# Patient Record
Sex: MALE | Race: WHITE | NOT HISPANIC OR LATINO | Employment: FULL TIME | URBAN - METROPOLITAN AREA
[De-identification: names, ages, dates, MRNs, and addresses within clinical notes are randomized per-mention and may not be internally consistent; named-entity substitution may affect disease eponyms.]

---

## 2020-11-30 ENCOUNTER — HOSPITAL ENCOUNTER (OUTPATIENT)
Dept: URGENT CARE | Facility: CLINIC | Age: 65
Discharge: HOME | End: 2020-11-30

## 2020-12-02 LAB — SARS-COV-2 RNA SPEC QL NAA+PROBE: NOT DETECTED

## 2020-12-16 ENCOUNTER — OFFICE VISIT CONVERTED (OUTPATIENT)
Dept: SURGERY | Facility: CLINIC | Age: 65
End: 2020-12-16
Attending: NURSE PRACTITIONER

## 2021-01-13 ENCOUNTER — HOSPITAL ENCOUNTER (OUTPATIENT)
Dept: GASTROENTEROLOGY | Facility: HOSPITAL | Age: 66
Setting detail: HOSPITAL OUTPATIENT SURGERY
Discharge: HOME | End: 2021-01-13
Attending: SURGERY

## 2021-05-10 NOTE — H&P
History and Physical      Patient Name: Rehan Ramirez   Patient ID: [id number]   Sex: Male   YOB: 1955    Primary Care Provider: Guillaume Hankins MD   Referring Provider: Guillaume Hankins MD    Visit Date: December 16, 2020    Provider: LINDA Palencia   Location: [facility]   Location Address: [address] [place] [address]  [id number]   Location Phone: [phone]          Chief Complaint  · Requesting colonoscopy  · Age 50 or over  · Here today for a pre-surgical colon screening visit  · Personal History of Polyps      History Of Present Illness  The patient is a 65 year old /White male presenting to the Surgical Specialist office on a referral from Guillaume Hankins MD.   Rehan Ramirez needs to have a screening colonoscopy.   Patient states that they have had a colonoscopy. 10 years ago   Patient currently complains of: no complaints   Patient Does not have family history of colon cancer.      Patient presents today on referral from Dr. Guillaume Hankins for screening colonoscopy.  Patient denies abdominal pain, diarrhea, or rectal bleeding.  Denies family history of colorectal cancer.  Patient reports last colonoscopy was 10 years ago and had some polyps removed.    Patient reports that he had a recent recurrent diverticulitis and was treated with Cipro and Keflex about 2 months ago.       Past Medical History  Disease Name Date Onset Notes   Arthritis --  --    High blood pressure --  --    High cholesterol --  --          Past Surgical History  Procedure Name Date Notes   Appendectomy --  --    Back --  --    Hernia --  --          Medication List  Name Date Started Instructions   diclofenac sodium 75 mg oral tablet,delayed release (/EC)  take 1 tablet (75 mg) by oral route 2 times per day   lisinopril 5 mg oral tablet  take 1 tablet (5 mg) by oral route once daily   metoprolol tartrate 50 mg oral tablet  take 1 tablet (50 mg)  "by oral route 2 times per day with meals   omeprazole 20 mg oral capsule,delayed release(DR/EC)  take 1 capsule (20 mg) by oral route once daily before a meal   pravastatin 10 mg oral tablet  take 1 tablet (10 mg) by oral route once daily         Allergy List  Allergen Name Date Reaction Notes   Bactrim DS --  rash --    SULFA (SULFONAMIDES) --  --  --        Allergies Reconciled  Family Medical History  Disease Name Relative/Age Notes   Diabetes, unspecified type Brother/   Brother   Renal Calculus Sister/   Sister   -Father's Family History Unknown Father/   Father   -Mother's Family History Unknown Mother/   Mother         Social History  Finding Status Start/Stop Quantity Notes   Alcohol Never 0/0 --  drinks no   Caffeine Current every day 0/0 --  drinks regularly; coffee and soft drinks; 3-4 times per day   Second hand smoke exposure Never 0/0 --  no   Tobacco Never --/-- --  --          Review of Systems  · Constitutional  o Denies  o : fever, chills  · Eyes  o Denies  o : yellowish discoloration of eyes  · HENT  o Denies  o : difficulty swallowing  · Cardiovascular  o Denies  o : chest pain, chest pain on exertion  · Respiratory  o Denies  o : shortness of breath  · Gastrointestinal  o Denies  o : nausea, vomiting, diarrhea, constipation  · Genitourinary  o Denies  o : abnormal color of urine  · Integument  o Denies  o : rash  · Neurologic  o Denies  o : tingling or numbness  · Musculoskeletal  o Denies  o : joint pain  · Endocrine  o Denies  o : weight gain, weight loss      Vitals  Date Time BP Position Site L\R Cuff Size HR RR TEMP (F) WT  HT  BMI kg/m2 BSA m2 O2 Sat FR L/min FiO2        12/16/2020 09:21 AM       14  225lbs 2oz 6'  1\" 29.7 2.29             Physical Examination  · Constitutional  o Appearance  o : well developed, well-nourished, patient in no apparent distress  · Head and Face  o Head  o :   § Inspection  § : atraumatic, normocephalic  o Face  o :   § Inspection  § : no facial " lesions  · Eyes  o Conjunctivae  o : conjunctivae normal  o Sclerae  o : sclerae white  · Neck  o Inspection/Palpation  o : normal appearance, no masses or tenderness, trachea midline  · Respiratory  o Respiratory Effort  o : breathing unlabored  · Skin and Subcutaneous Tissue  o General Inspection  o : no lesions present, no areas of discoloration, skin turgor normal, texture normal  · Neurologic  o Mental Status Examination  o :   § Orientation  § : grossly oriented to person, place and time  § Attention  § : attention normal, concentration abilities normal  § Fund of Knowledge  § : fund of knowledge within normal limits, patient aware of current events  o Gait and Station  o : normal gait, able to stand without difficulty  · Psychiatric  o Judgement and Insight  o : judgment and insight intact  o Mood and Affect  o : mood normal, affect appropriate              Assessment  · Personal history of colonic polyps     V12.72/Z86.010  · Screening for colon cancer     V76.51/Z12.11    Problems Reconciled  Plan  · Orders  o Consent for Colonoscopy Screening-Possible risk/complications, benefits, and alternatives to surgical or invasive procedure have been explained to patient and/or legal guardian. -Patient has been evaluated and can tolerate anesthesia and/or sedation. Risks, benefits, and alternatives to anesthesia and sedation have been explained to patient and/or legal guardian. () - V12.72/Z86.010, V76.51/Z12.11 - 01/13/2021  · Medications  o Medications have been Reconciled  o Transition of Care or Provider Policy  · Instructions  o Surgical Facility: [facility]  o Handouts Provided Pre-Procedure Instructions including date, time, and location of procedure.   o PLAN: Proceeed with colonoscopy. Patient understands risks/benefits and is willing to proceed.   o ***Surgical Orders***  o RISK AND BENEFITS:  o Given these options, the patient has verbally expressed an understanding of the risks of the  surgery and finds these risks acceptable. Will proceed with surgery as soon as possible.  o O.R. PREP: Per protocol   o IV: Per Anesthesia  o Please sign permit for: Colonoscopy with possible biopsies by Dr. Azevedo.  o The above History and Physical Examination has been completed within 30 days of admission.  o ***Patient Status***  o Outpatient  o Follow up in the in the office post procedure.  o Electronically Identified Patient Education Materials Provided Electronically            Electronically Signed by: LINDA Palencia -Author on December 16, 2020 09:51:13 AM

## 2021-05-14 VITALS — HEIGHT: 73 IN | WEIGHT: 225.12 LBS | BODY MASS INDEX: 29.83 KG/M2 | RESPIRATION RATE: 14 BRPM

## 2022-02-07 ENCOUNTER — OFFICE VISIT (OUTPATIENT)
Dept: UROLOGY | Facility: CLINIC | Age: 67
End: 2022-02-07

## 2022-02-07 VITALS — HEIGHT: 73 IN | RESPIRATION RATE: 12 BRPM | BODY MASS INDEX: 30.38 KG/M2 | WEIGHT: 229.2 LBS

## 2022-02-07 DIAGNOSIS — R35.0 FREQUENCY OF URINATION: Primary | ICD-10-CM

## 2022-02-07 DIAGNOSIS — R31.0 GROSS HEMATURIA: ICD-10-CM

## 2022-02-07 PROBLEM — I10 HIGH BLOOD PRESSURE: Status: ACTIVE | Noted: 2022-02-07

## 2022-02-07 PROBLEM — E78.00 HIGH CHOLESTEROL: Status: ACTIVE | Noted: 2022-02-07

## 2022-02-07 PROBLEM — M19.90 ARTHRITIS: Status: ACTIVE | Noted: 2022-02-07

## 2022-02-07 LAB
BILIRUB BLD-MCNC: NEGATIVE MG/DL
CLARITY, POC: CLEAR
COLOR UR: YELLOW
EXPIRATION DATE: NORMAL
GLUCOSE UR STRIP-MCNC: NEGATIVE MG/DL
KETONES UR QL: NEGATIVE
LEUKOCYTE EST, POC: NEGATIVE
Lab: NORMAL
NITRITE UR-MCNC: NEGATIVE MG/ML
PH UR: 6 [PH] (ref 5–8)
PROT UR STRIP-MCNC: NEGATIVE MG/DL
RBC # UR STRIP: NEGATIVE /UL
SP GR UR: 1.02 (ref 1–1.03)
URINE VOLUME: 0
UROBILINOGEN UR QL: NORMAL

## 2022-02-07 PROCEDURE — 51798 US URINE CAPACITY MEASURE: CPT | Performed by: NURSE PRACTITIONER

## 2022-02-07 PROCEDURE — 99213 OFFICE O/P EST LOW 20 MIN: CPT | Performed by: NURSE PRACTITIONER

## 2022-02-07 PROCEDURE — 81003 URINALYSIS AUTO W/O SCOPE: CPT | Performed by: NURSE PRACTITIONER

## 2022-02-07 RX ORDER — LISINOPRIL 5 MG/1
TABLET ORAL
COMMUNITY

## 2022-02-07 RX ORDER — LISINOPRIL AND HYDROCHLOROTHIAZIDE 12.5; 1 MG/1; MG/1
1 TABLET ORAL DAILY
COMMUNITY
Start: 2022-01-27

## 2022-02-07 RX ORDER — METOPROLOL TARTRATE 50 MG/1
50 TABLET, FILM COATED ORAL DAILY
COMMUNITY
Start: 2022-01-27

## 2022-02-07 RX ORDER — DICLOFENAC SODIUM 75 MG/1
75 TABLET, DELAYED RELEASE ORAL 2 TIMES DAILY PRN
COMMUNITY
Start: 2022-01-27

## 2022-02-07 RX ORDER — OMEPRAZOLE 40 MG/1
40 CAPSULE, DELAYED RELEASE ORAL DAILY
COMMUNITY
Start: 2022-01-27

## 2022-02-07 RX ORDER — PRAVASTATIN SODIUM 10 MG
10 TABLET ORAL
COMMUNITY
Start: 2022-01-27

## 2022-02-07 NOTE — PROGRESS NOTES
Chief Complaint: Urinary Urgency (Pt here for ua urgency.  Pt says he hardly ever gets up at night.  Pt has seen blood in his urine.  Was checked for UTI and that was negative.)    Subjective         History of Present Illness  Rehan Ramirez is a 66 y.o. male presents to [facility] to be seen for urinary frequency.    Patient states that about a month ago he was with urinary urgency and frequency.    He states at that time he also had some low back pain and flank pain.     Patient has had some gross hematuria about 2 days after the urgency and frequency started this lasted for a few days.    Patient states that the pain is gone now.     He states his left testicle swells intermittently from time to time.     patient non smoker.    No hx of renal stone.     Sister with recurrent stones and a staghorn calculus that caused non functioning kidney.     No family HX of  malignancies.       Objective     Past Medical History:   Diagnosis Date   • Acid reflux    • Arthritis    • High cholesterol    • Hypertension        Past Surgical History:   Procedure Laterality Date   • APPENDECTOMY     • BACK SURGERY     • COLONOSCOPY     • HERNIA REPAIR     • TONSILLECTOMY           Current Outpatient Medications:   •  diclofenac (VOLTAREN) 75 MG EC tablet, Take 75 mg by mouth 2 (Two) Times a Day As Needed., Disp: , Rfl:   •  lisinopril (PRINIVIL,ZESTRIL) 5 MG tablet, lisinopril 5 mg oral tablet take 1 tablet (5 mg) by oral route once daily   Active, Disp: , Rfl:   •  lisinopril-hydrochlorothiazide (PRINZIDE,ZESTORETIC) 10-12.5 MG per tablet, Take 1 tablet by mouth Daily., Disp: , Rfl:   •  metoprolol tartrate (LOPRESSOR) 50 MG tablet, Take 50 mg by mouth Daily., Disp: , Rfl:   •  omeprazole (priLOSEC) 40 MG capsule, Take 40 mg by mouth Daily., Disp: , Rfl:   •  pravastatin (PRAVACHOL) 10 MG tablet, Take 10 mg by mouth every night at bedtime., Disp: , Rfl:     Allergies   Allergen Reactions   • Sulfa  "Antibiotics Hives   • Sulfamethoxazole-Trimethoprim Rash        Family History   Problem Relation Age of Onset   • Heart disease Father    • Cancer Mother    • Diabetes Maternal Grandmother        Social History     Socioeconomic History   • Marital status:    Tobacco Use   • Smoking status: Never Smoker   • Smokeless tobacco: Never Used   Vaping Use   • Vaping Use: Never used   Substance and Sexual Activity   • Alcohol use: Defer   • Sexual activity: Defer       Vital Signs:   Resp 12   Ht 185.4 cm (73\")   Wt 104 kg (229 lb 3.2 oz)   BMI 30.24 kg/m²      Physical Exam     Result Review :   The following data was reviewed by: LINDA Murray on 02/07/2022:  Results for orders placed or performed in visit on 02/07/22   Bladder Scan   Result Value Ref Range    Urine Volume 0    POC Urinalysis Dipstick, Automated    Specimen: Urine   Result Value Ref Range    Color Yellow Yellow, Straw, Dark Yellow, Rochelle    Clarity, UA Clear Clear    Specific Gravity  1.025 1.005 - 1.030    pH, Urine 6.0 5.0 - 8.0    Leukocytes Negative Negative    Nitrite, UA Negative Negative    Protein, POC Negative Negative mg/dL    Glucose, UA Negative Negative, 1000 mg/dL (3+) mg/dL    Ketones, UA Negative Negative    Urobilinogen, UA Normal Normal    Bilirubin Negative Negative    Blood, UA Negative Negative    Lot Number [id number]     Expiration Date 2,022/12           Procedures        Assessment and Plan    Diagnoses and all orders for this visit:    1. Frequency of urination (Primary)  -     POC Urinalysis Dipstick, Automated  -     Bladder Scan    2. Gross hematuria  -     Cancel: CT Abdomen Pelvis With & Without Contrast; Future  -     CT Abdomen Pelvis With & Without Contrast; Future    Discussed with patient his symptoms sound consistent with a passed renal stone however given negative history for renal stones I would like to perform a work-up for gross hematuria.  Patient will be scheduled for a CT scan of the abdomen " and pelvis with and without IV contrast with delayed imaging as well as a cystoscopy to evaluate his upper and lower urinary tracts for any underlying pathology.          I spent 15 minutes caring for Rehan on this date of service. This time includes time spent by me in the following activities:reviewing tests, obtaining and/or reviewing a separately obtained history, performing a medically appropriate examination and/or evaluation , counseling and educating the patient/family/caregiver, ordering medications, tests, or procedures, and documenting information in the medical record  Follow Up   Return for 4-week follow-up with me schedule with Dr. Del Rio For Cystoscopy.  Patient was given instructions and counseling regarding his condition or for health maintenance advice. Please see specific information pulled into the AVS if appropriate.         This document has been electronically signed by LINDA Murray  February 7, 2022 14:41 EST

## 2022-02-23 ENCOUNTER — TELEPHONE (OUTPATIENT)
Dept: UROLOGY | Facility: CLINIC | Age: 67
End: 2022-02-23

## 2022-02-23 ENCOUNTER — HOSPITAL ENCOUNTER (OUTPATIENT)
Dept: CT IMAGING | Facility: HOSPITAL | Age: 67
Discharge: HOME | End: 2022-02-23
Admitting: NURSE PRACTITIONER

## 2022-02-23 DIAGNOSIS — K40.90 UNILATERAL INGUINAL HERNIA WITHOUT OBSTRUCTION OR GANGRENE, RECURRENCE NOT SPECIFIED: Primary | ICD-10-CM

## 2022-02-23 DIAGNOSIS — R31.0 GROSS HEMATURIA: ICD-10-CM

## 2022-02-23 LAB — CREAT BLDA-MCNC: 1.3 MG/DL

## 2022-02-23 PROCEDURE — 0 IOPAMIDOL PER 1 ML: Performed by: NURSE PRACTITIONER

## 2022-02-23 PROCEDURE — 74178 CT ABD&PLV WO CNTR FLWD CNTR: CPT

## 2022-02-23 PROCEDURE — 82565 ASSAY OF CREATININE: CPT

## 2022-02-23 RX ADMIN — IOPAMIDOL 100 ML: 755 INJECTION, SOLUTION INTRAVENOUS at 11:51

## 2022-02-23 NOTE — TELEPHONE ENCOUNTER
Caller: ELIANA EUGENE    Relationship: SELF    Best call back number: [phone]    What form or medical record are you requesting: WORK RESTRICTIONS NOTE    Who is requesting this form or medical record from you: EMPLOYER    How would you like to receive the form or medical records (pick-up, mail, fax): FAX  If fax, what is the fax number: [phone]    Timeframe paperwork needed: ASAP    Additional notes: PT NEEDS WORK RESTRICTIONS LIFT LIMITATIONS FOR EMPLOYER.

## 2022-02-24 ENCOUNTER — TELEPHONE (OUTPATIENT)
Dept: UROLOGY | Facility: CLINIC | Age: 67
End: 2022-02-24

## 2022-02-24 NOTE — TELEPHONE ENCOUNTER
CALLED PT TO SCHEDULE CYSTO W/ LIVE PER RITA DOUGHERTY FOR GROSS HEMATURIA. NO ANSWER/VM NOT SET UP

## 2022-03-02 ENCOUNTER — OFFICE VISIT (OUTPATIENT)
Dept: SURGERY | Facility: CLINIC | Age: 67
End: 2022-03-02

## 2022-03-02 VITALS — HEIGHT: 73 IN | WEIGHT: 229 LBS | BODY MASS INDEX: 30.35 KG/M2 | RESPIRATION RATE: 16 BRPM

## 2022-03-02 DIAGNOSIS — K40.90 NON-RECURRENT UNILATERAL INGUINAL HERNIA WITHOUT OBSTRUCTION OR GANGRENE: Primary | ICD-10-CM

## 2022-03-02 PROCEDURE — 99204 OFFICE O/P NEW MOD 45 MIN: CPT | Performed by: SURGERY

## 2022-03-02 NOTE — PROGRESS NOTES
Chief Complaint: Hernia    Subjective         History of Present Illness  Rehan Ramirez is a 66 y.o. male presents to [facility]. The patient is to be seen for inguinal hernia.    The patient has consented to being recorded using [employer].    The patient reports that he was found to have an inguinal hernia on the left side via CT scan. He denies having any pain secondary to this hernia; however, he has experienced swelling in the left testicle on occasion. He was experiencing symptoms of urinary frequency but was diagnosed with kidney stones. He was told they would eventually pass with no intervention needed. He endorses staying well hydrated and having normal bowel movements. Of note, the patient has a history of a right hernia repair in 1998. According to the patient, he did experience pain with the onset of the past hernia.     He does have questions regarding the recovery after surgery and when he will be able to return to work. He is a manager at a grocery store. He would like to discuss the surgery with his wife before he moves forward with scheduling.     Objective     Past Medical History:   Diagnosis Date   • Acid reflux    • Arthritis    • High cholesterol    • Hypertension        Past Surgical History:   Procedure Laterality Date   • APPENDECTOMY     • BACK SURGERY     • COLONOSCOPY     • HERNIA REPAIR     • TONSILLECTOMY           Current Outpatient Medications:   •  diclofenac (VOLTAREN) 75 MG EC tablet, Take 75 mg by mouth 2 (Two) Times a Day As Needed., Disp: , Rfl:   •  lisinopril (PRINIVIL,ZESTRIL) 5 MG tablet, lisinopril 5 mg oral tablet take 1 tablet (5 mg) by oral route once daily   Active, Disp: , Rfl:   •  lisinopril-hydrochlorothiazide (PRINZIDE,ZESTORETIC) 10-12.5 MG per tablet, Take 1 tablet by mouth Daily., Disp: , Rfl:   •  metoprolol tartrate (LOPRESSOR) 50 MG tablet, Take 50 mg by mouth Daily., Disp: , Rfl:   •  omeprazole (priLOSEC) 40 MG capsule, Take  "40 mg by mouth Daily., Disp: , Rfl:   •  pravastatin (PRAVACHOL) 10 MG tablet, Take 10 mg by mouth every night at bedtime., Disp: , Rfl:     Allergies   Allergen Reactions   • Sulfa Antibiotics Hives   • Sulfamethoxazole-Trimethoprim Rash        Family History   Problem Relation Age of Onset   • Heart disease Father    • Cancer Mother    • Diabetes Maternal Grandmother        Social History     Socioeconomic History   • Marital status:    Tobacco Use   • Smoking status: Never Smoker   • Smokeless tobacco: Never Used   Vaping Use   • Vaping Use: Never used   Substance and Sexual Activity   • Alcohol use: Defer   • Sexual activity: Defer       Vital Signs:   Resp 16   Ht 185.4 cm (73\")   Wt 104 kg (229 lb)   BMI 30.21 kg/m²      Physical Exam  Constitutional:       General: He is not in acute distress.  Pulmonary:      Effort: No respiratory distress.   Abdominal:      Palpations: Abdomen is soft.      Comments: He has what feels to be some fatty tissue in his left groin, but it is nonreducible. I cannot feel a true hernia defect.          Result Review :            Procedures        Assessment and Plan    There are no diagnoses linked to this encounter.     Patient with a likely left inguinal hernia based on CT scan  - The patient would like to discuss with his wife before moving forward with the surgery. Apparently, he is asymptomatic from the hernia and has some social issues. The patient will call when he is ready to schedule. We discussed robotic inguinal hernia repair.     Discussed with the patient - all questions were answered they voiced understanding and agreed to proceed with above plan        Follow Up   No follow-ups on file.  Patient was given instructions and counseling regarding his condition or for health maintenance advice. Please see specific information pulled into the AVS if appropriate.       Transcribed from ambient dictation for Sander Vela MD by Aurelia Boone.  03/02/22   16:42 " EST    Patient verbalized consent to the visit recording.  I have personally performed the services described in this document as transcribed by the above individual, and it is both accurate and complete.  Sander Vela MD  3/2/2022  20:28 EST

## 2022-03-10 PROBLEM — R35.0 BENIGN PROSTATIC HYPERPLASIA WITH URINARY FREQUENCY: Status: ACTIVE | Noted: 2022-03-10

## 2022-03-10 PROBLEM — N40.1 BENIGN PROSTATIC HYPERPLASIA WITH URINARY FREQUENCY: Status: ACTIVE | Noted: 2022-03-10

## 2022-03-10 PROBLEM — R31.0 GROSS HEMATURIA: Status: ACTIVE | Noted: 2022-03-10

## 2022-03-10 NOTE — PROGRESS NOTES
Procedures       Urinalysis was checked today and was negative for signs of infection      Cytoscopy Procedure:     After informed consent    Procedure: Flexible cytoscope was passed per urethra      Patient had false passage only a couple millimeters in the meatus.  He had a flap of skin and 2 small false passages toward the left side of the penis.  On the right side want to get past the flap, his urethra was open and I could get the scope through without issue.    4 cm prostate, median lobe was minimal      Moderate trabeculations throughout.  No pathology      There were no tumors, lesions, stones, or other abnormalities noted within the bladder. Of note, there was no increased vascularity as well. Both ureteral orifices were identified and were normal in appearance. The flexible cytoscope was removed. The patient tolerated the procedure well.         2/22 CT uro-- large left inguinal hernia containing fat which extends down to the left scrotal sac.  Small bilateral hydroceles.  Postop changes from right inguinal hernia.  There is residual fat in the inguinal canal.  4 mm nonobstructing stone right kidney, delayed phase okay.  No other stones.  Images reviewed    PVR    3/22   048    PLAN    Discussed CT today.  Patient has seen general surgery as far as his hernia is concerned and they are following this.    4 mm nonobstructing stone.  After discussion we will follow conservatively    The patient was counseled on the preventative measures of kidney stones today.  This included increasing fluid intake to make at least 1.5 ml daily, decreasing meat intake, decreasing salt intake and taking in a normal amount of calcium (1000 mg daily).  Information handout given on this today.         We also discussed the DASH diet today for stone prevention and handout was given      Patient is mildly bothered by urination.  After discussion of risk and benefits we will start him on Flomax 0.4 mg daily.  Risk and benefits  discussed    Follow-up in 4 months    PVR follow-up        This document has been electronically signed by James Del Rio MD  March 10, 2022 13:33 EST

## 2022-03-11 ENCOUNTER — OFFICE VISIT (OUTPATIENT)
Dept: UROLOGY | Facility: CLINIC | Age: 67
End: 2022-03-11

## 2022-03-11 DIAGNOSIS — R31.0 GROSS HEMATURIA: ICD-10-CM

## 2022-03-11 DIAGNOSIS — R35.0 BENIGN PROSTATIC HYPERPLASIA WITH URINARY FREQUENCY: Primary | ICD-10-CM

## 2022-03-11 DIAGNOSIS — N40.1 BENIGN PROSTATIC HYPERPLASIA WITH URINARY FREQUENCY: Primary | ICD-10-CM

## 2022-03-11 PROCEDURE — 52000 CYSTOURETHROSCOPY: CPT | Performed by: UROLOGY

## 2022-03-16 DIAGNOSIS — N40.0 BENIGN PROSTATIC HYPERPLASIA, UNSPECIFIED WHETHER LOWER URINARY TRACT SYMPTOMS PRESENT: Primary | ICD-10-CM

## 2022-03-16 RX ORDER — TAMSULOSIN HYDROCHLORIDE 0.4 MG/1
1 CAPSULE ORAL DAILY
Qty: 90 CAPSULE | Refills: 4 | Status: SHIPPED | OUTPATIENT
Start: 2022-03-16 | End: 2022-07-11 | Stop reason: SDUPTHER

## 2022-07-09 NOTE — PROGRESS NOTES
Chief Complaint    Urologic complaint    Subjective          Rehan Ramirez presents to [facility]  History of Present Illness    BPH  GH      No recent gross hematuria.  No pain been doing well    Voiding without issue.  Started on tamsulosin 0.4 mg daily months ago, he is doing much better.  He is better stream, less dribbling.  Nocturia x0      3/22 cystoscopy-false passage couple millimeters in the meatus.  2 false passages toward the left side on the right side I did get past the flap but was a little difficult.  4 cm prostate median lobe minimal moderate trabeculations no pathology    2/22 CT uro-- large left inguinal hernia containing fat which extends down to the left scrotal sac.  Small bilateral hydroceles.  Postop changes from right inguinal hernia.  There is residual fat in the inguinal canal.  4 mm nonobstructing stone right kidney, delayed phase okay.  No other stones.  Images reviewed     PVR     7/22   000  3/22   048    General surgery is keeping an eye on him for his hernias.    Previous    Patient states that about a month ago he was with urinary urgency and frequency.     He states at that time he also had some low back pain and flank pain.      Patient has had some gross hematuria about 2 days after the urgency and frequency started this lasted for a few days.     Patient states that the pain is gone now.      He states his left testicle swells intermittently from time to time.      patient non smoker.     No hx of renal stone.      Sister with recurrent stones and a staghorn calculus that caused non functioning kidney.      No family HX of  malignancies.        Results for orders placed or performed in visit on 07/11/22   Bladder Scan   Result Value Ref Range    Urine Volume 0    POC Urinalysis Dipstick, Automated    Specimen: Urine   Result Value Ref Range    Color Yellow Yellow, Straw, Dark Yellow, Rochelle    Clarity, UA Clear Clear    Specific Gravity  1.025  1.005 - 1.030    pH, Urine 6.5 5.0 - 8.0    Leukocytes Negative Negative    Nitrite, UA Negative Negative    Protein, POC Negative Negative mg/dL    Glucose, UA Negative Negative mg/dL    Ketones, UA Negative Negative    Urobilinogen, UA 1 E.U./dL  (A) Normal    Bilirubin Negative Negative    Blood, UA Negative Negative    Lot Number [id number]     Expiration Date 07/31/2023          Past History:  Medical History: has a past medical history of Acid reflux, Arthritis, High cholesterol, and Hypertension.   Surgical History: has a past surgical history that includes Appendectomy; Hernia repair; Back surgery; Colonoscopy; and Tonsillectomy.   Family History: family history includes Cancer in his mother; Diabetes in his maternal grandmother; Heart disease in his father.   Social History: reports that he has never smoked. He has never used smokeless tobacco. Alcohol use questions deferred to the physician.  Allergies: Sulfa antibiotics and Sulfamethoxazole-trimethoprim       Current Outpatient Medications:   •  diclofenac (VOLTAREN) 75 MG EC tablet, Take 75 mg by mouth 2 (Two) Times a Day As Needed., Disp: , Rfl:   •  lisinopril (PRINIVIL,ZESTRIL) 5 MG tablet, lisinopril 5 mg oral tablet take 1 tablet (5 mg) by oral route once daily   Active, Disp: , Rfl:   •  lisinopril-hydrochlorothiazide (PRINZIDE,ZESTORETIC) 10-12.5 MG per tablet, Take 1 tablet by mouth Daily., Disp: , Rfl:   •  metoprolol tartrate (LOPRESSOR) 50 MG tablet, Take 50 mg by mouth Daily., Disp: , Rfl:   •  omeprazole (priLOSEC) 40 MG capsule, Take 40 mg by mouth Daily., Disp: , Rfl:   •  pravastatin (PRAVACHOL) 10 MG tablet, Take 10 mg by mouth every night at bedtime., Disp: , Rfl:   •  tamsulosin (FLOMAX) 0.4 MG capsule 24 hr capsule, Take 1 capsule by mouth Daily., Disp: 90 capsule, Rfl: 4     Physical exam       Alert and orient x3  Well appearing, well developed, in no acute distress   Unlabored respirations        Grossly oriented to person, place and  time, judgment is intact, normal mood and affect    Results for orders placed or performed during the hospital encounter of 02/23/22   POC Creatinine    Specimen: Blood   Result Value Ref Range    Creatinine 1.30 mg/dL    GFR MDRD       GFR MDRD Non African American 55 mL/min/1.73 sq.M        Objective     Vital Signs:   There were no vitals taken for this visit.             Assessment and Plan    Diagnoses and all orders for this visit:    1. Benign prostatic hyperplasia with urinary frequency (Primary)    2. Gross hematuria         4 mm nonobstructing stone       No surgical intervention at this time we will follow conservatively           BPH      Doing better on Flomax 0.4.  Refilled today      Follow-up with nurse practitioner in 1 year         PVR follow-up

## 2022-07-11 ENCOUNTER — OFFICE VISIT (OUTPATIENT)
Dept: UROLOGY | Facility: CLINIC | Age: 67
End: 2022-07-11

## 2022-07-11 VITALS — HEIGHT: 73 IN | BODY MASS INDEX: 30.88 KG/M2 | WEIGHT: 233 LBS

## 2022-07-11 DIAGNOSIS — R35.0 BENIGN PROSTATIC HYPERPLASIA WITH URINARY FREQUENCY: Primary | ICD-10-CM

## 2022-07-11 DIAGNOSIS — N40.0 BENIGN PROSTATIC HYPERPLASIA, UNSPECIFIED WHETHER LOWER URINARY TRACT SYMPTOMS PRESENT: ICD-10-CM

## 2022-07-11 DIAGNOSIS — R31.0 GROSS HEMATURIA: ICD-10-CM

## 2022-07-11 DIAGNOSIS — N40.1 BENIGN PROSTATIC HYPERPLASIA WITH URINARY FREQUENCY: Primary | ICD-10-CM

## 2022-07-11 LAB
BILIRUB BLD-MCNC: NEGATIVE MG/DL
CLARITY, POC: CLEAR
COLOR UR: YELLOW
EXPIRATION DATE: ABNORMAL
GLUCOSE UR STRIP-MCNC: NEGATIVE MG/DL
KETONES UR QL: NEGATIVE
LEUKOCYTE EST, POC: NEGATIVE
Lab: ABNORMAL
NITRITE UR-MCNC: NEGATIVE MG/ML
PH UR: 6.5 [PH] (ref 5–8)
PROT UR STRIP-MCNC: NEGATIVE MG/DL
RBC # UR STRIP: NEGATIVE /UL
SP GR UR: 1.02 (ref 1–1.03)
URINE VOLUME: 0
UROBILINOGEN UR QL: ABNORMAL

## 2022-07-11 PROCEDURE — 99214 OFFICE O/P EST MOD 30 MIN: CPT | Performed by: UROLOGY

## 2022-07-11 PROCEDURE — 51798 US URINE CAPACITY MEASURE: CPT | Performed by: UROLOGY

## 2022-07-11 PROCEDURE — 81003 URINALYSIS AUTO W/O SCOPE: CPT | Performed by: UROLOGY

## 2022-07-11 RX ORDER — TAMSULOSIN HYDROCHLORIDE 0.4 MG/1
1 CAPSULE ORAL DAILY
Qty: 90 CAPSULE | Refills: 4 | Status: SHIPPED | OUTPATIENT
Start: 2022-07-11

## 2023-10-26 ENCOUNTER — ANESTHESIA EVENT (OUTPATIENT)
Dept: PERIOP | Facility: HOSPITAL | Age: 68
End: 2023-10-26
Payer: MEDICARE

## 2023-10-26 ENCOUNTER — HOSPITAL ENCOUNTER (EMERGENCY)
Facility: HOSPITAL | Age: 68
Discharge: HOME | End: 2023-10-26
Attending: EMERGENCY MEDICINE
Payer: MEDICARE

## 2023-10-26 ENCOUNTER — APPOINTMENT (OUTPATIENT)
Dept: CT IMAGING | Facility: HOSPITAL | Age: 68
End: 2023-10-26
Payer: MEDICARE

## 2023-10-26 ENCOUNTER — APPOINTMENT (OUTPATIENT)
Dept: GENERAL RADIOLOGY | Facility: HOSPITAL | Age: 68
End: 2023-10-26
Payer: MEDICARE

## 2023-10-26 ENCOUNTER — ANESTHESIA (OUTPATIENT)
Dept: PERIOP | Facility: HOSPITAL | Age: 68
End: 2023-10-26
Payer: MEDICARE

## 2023-10-26 VITALS
OXYGEN SATURATION: 95 % | SYSTOLIC BLOOD PRESSURE: 165 MMHG | WEIGHT: 229.28 LBS | HEART RATE: 61 BPM | BODY MASS INDEX: 30.39 KG/M2 | DIASTOLIC BLOOD PRESSURE: 81 MMHG | TEMPERATURE: 97.5 F | RESPIRATION RATE: 16 BRPM | HEIGHT: 73 IN

## 2023-10-26 DIAGNOSIS — N20.1 RIGHT URETERAL STONE: Primary | ICD-10-CM

## 2023-10-26 DIAGNOSIS — N20.0 KIDNEY STONE: ICD-10-CM

## 2023-10-26 LAB
ALBUMIN SERPL-MCNC: 4 G/DL (ref 3.5–5.2)
ALBUMIN/GLOB SERPL: 1.3 G/DL
ALP SERPL-CCNC: 96 U/L (ref 39–117)
ALT SERPL W P-5'-P-CCNC: 77 U/L (ref 1–41)
ANION GAP SERPL CALCULATED.3IONS-SCNC: 7.9 MMOL/L (ref 5–15)
AST SERPL-CCNC: 43 U/L (ref 1–40)
BACTERIA UR QL AUTO: ABNORMAL /HPF
BASOPHILS # BLD AUTO: 0.05 10*3/MM3 (ref 0–0.2)
BASOPHILS NFR BLD AUTO: 0.7 % (ref 0–1.5)
BILIRUB SERPL-MCNC: 0.4 MG/DL (ref 0–1.2)
BILIRUB UR QL STRIP: NEGATIVE
BUN SERPL-MCNC: 23 MG/DL (ref 8–23)
BUN/CREAT SERPL: 23.2 (ref 7–25)
CALCIUM SPEC-SCNC: 9.3 MG/DL (ref 8.6–10.5)
CHLORIDE SERPL-SCNC: 104 MMOL/L (ref 98–107)
CLARITY UR: CLEAR
CO2 SERPL-SCNC: 28.1 MMOL/L (ref 22–29)
COLOR UR: YELLOW
CREAT SERPL-MCNC: 0.99 MG/DL (ref 0.76–1.27)
DEPRECATED RDW RBC AUTO: 43.8 FL (ref 37–54)
EGFRCR SERPLBLD CKD-EPI 2021: 84 ML/MIN/1.73
EOSINOPHIL # BLD AUTO: 0.18 10*3/MM3 (ref 0–0.4)
EOSINOPHIL NFR BLD AUTO: 2.3 % (ref 0.3–6.2)
ERYTHROCYTE [DISTWIDTH] IN BLOOD BY AUTOMATED COUNT: 13.2 % (ref 12.3–15.4)
GLOBULIN UR ELPH-MCNC: 3 GM/DL
GLUCOSE SERPL-MCNC: 140 MG/DL (ref 65–99)
GLUCOSE UR STRIP-MCNC: NEGATIVE MG/DL
HCT VFR BLD AUTO: 43.1 % (ref 37.5–51)
HGB BLD-MCNC: 14.1 G/DL (ref 13–17.7)
HGB UR QL STRIP.AUTO: NEGATIVE
HOLD SPECIMEN: NORMAL
HOLD SPECIMEN: NORMAL
HYALINE CASTS UR QL AUTO: ABNORMAL /LPF
IMM GRANULOCYTES # BLD AUTO: 0.07 10*3/MM3 (ref 0–0.05)
IMM GRANULOCYTES NFR BLD AUTO: 0.9 % (ref 0–0.5)
KETONES UR QL STRIP: NEGATIVE
LEUKOCYTE ESTERASE UR QL STRIP.AUTO: ABNORMAL
LIPASE SERPL-CCNC: 19 U/L (ref 13–60)
LYMPHOCYTES # BLD AUTO: 2 10*3/MM3 (ref 0.7–3.1)
LYMPHOCYTES NFR BLD AUTO: 26 % (ref 19.6–45.3)
MCH RBC QN AUTO: 29.8 PG (ref 26.6–33)
MCHC RBC AUTO-ENTMCNC: 32.7 G/DL (ref 31.5–35.7)
MCV RBC AUTO: 91.1 FL (ref 79–97)
MONOCYTES # BLD AUTO: 0.72 10*3/MM3 (ref 0.1–0.9)
MONOCYTES NFR BLD AUTO: 9.4 % (ref 5–12)
NEUTROPHILS NFR BLD AUTO: 4.66 10*3/MM3 (ref 1.7–7)
NEUTROPHILS NFR BLD AUTO: 60.7 % (ref 42.7–76)
NITRITE UR QL STRIP: NEGATIVE
NRBC BLD AUTO-RTO: 0 /100 WBC (ref 0–0.2)
PH UR STRIP.AUTO: 7 [PH] (ref 5–8)
PLATELET # BLD AUTO: 251 10*3/MM3 (ref 140–450)
PMV BLD AUTO: 8.8 FL (ref 6–12)
POTASSIUM SERPL-SCNC: 4.5 MMOL/L (ref 3.5–5.2)
PROT SERPL-MCNC: 7 G/DL (ref 6–8.5)
PROT UR QL STRIP: NEGATIVE
RBC # BLD AUTO: 4.73 10*6/MM3 (ref 4.14–5.8)
RBC # UR STRIP: ABNORMAL /HPF
REF LAB TEST METHOD: ABNORMAL
SODIUM SERPL-SCNC: 140 MMOL/L (ref 136–145)
SP GR UR STRIP: >1.03 (ref 1–1.03)
SQUAMOUS #/AREA URNS HPF: ABNORMAL /HPF
UROBILINOGEN UR QL STRIP: ABNORMAL
WBC # UR STRIP: ABNORMAL /HPF
WBC NRBC COR # BLD: 7.68 10*3/MM3 (ref 3.4–10.8)
WHOLE BLOOD HOLD COAG: NORMAL
WHOLE BLOOD HOLD SPECIMEN: NORMAL

## 2023-10-26 PROCEDURE — 25010000002 PROPOFOL 10 MG/ML EMULSION: Performed by: NURSE ANESTHETIST, CERTIFIED REGISTERED

## 2023-10-26 PROCEDURE — 80053 COMPREHEN METABOLIC PANEL: CPT

## 2023-10-26 PROCEDURE — 88300 SURGICAL PATH GROSS: CPT | Performed by: UROLOGY

## 2023-10-26 PROCEDURE — C1894 INTRO/SHEATH, NON-LASER: HCPCS | Performed by: UROLOGY

## 2023-10-26 PROCEDURE — 87077 CULTURE AEROBIC IDENTIFY: CPT

## 2023-10-26 PROCEDURE — 81001 URINALYSIS AUTO W/SCOPE: CPT

## 2023-10-26 PROCEDURE — 99285 EMERGENCY DEPT VISIT HI MDM: CPT

## 2023-10-26 PROCEDURE — 87086 URINE CULTURE/COLONY COUNT: CPT

## 2023-10-26 PROCEDURE — 25010000002 LEVOFLOXACIN PER 250 MG: Performed by: NURSE ANESTHETIST, CERTIFIED REGISTERED

## 2023-10-26 PROCEDURE — 25010000002 ONDANSETRON PER 1 MG: Performed by: NURSE ANESTHETIST, CERTIFIED REGISTERED

## 2023-10-26 PROCEDURE — 25010000002 KETOROLAC TROMETHAMINE PER 15 MG

## 2023-10-26 PROCEDURE — 96361 HYDRATE IV INFUSION ADD-ON: CPT

## 2023-10-26 PROCEDURE — 25010000002 DEXAMETHASONE PER 1 MG: Performed by: NURSE ANESTHETIST, CERTIFIED REGISTERED

## 2023-10-26 PROCEDURE — 87186 SC STD MICRODIL/AGAR DIL: CPT

## 2023-10-26 PROCEDURE — 25010000002 LEVOFLOXACIN PER 250 MG: Performed by: NURSE PRACTITIONER

## 2023-10-26 PROCEDURE — C1769 GUIDE WIRE: HCPCS | Performed by: UROLOGY

## 2023-10-26 PROCEDURE — 96375 TX/PRO/DX INJ NEW DRUG ADDON: CPT

## 2023-10-26 PROCEDURE — 25510000001 IOPAMIDOL PER 1 ML: Performed by: EMERGENCY MEDICINE

## 2023-10-26 PROCEDURE — 76000 FLUOROSCOPY <1 HR PHYS/QHP: CPT

## 2023-10-26 PROCEDURE — 25010000002 MIDAZOLAM PER 1MG: Performed by: ANESTHESIOLOGY

## 2023-10-26 PROCEDURE — 99284 EMERGENCY DEPT VISIT MOD MDM: CPT | Performed by: UROLOGY

## 2023-10-26 PROCEDURE — 25010000002 FENTANYL CITRATE (PF) 50 MCG/ML SOLUTION: Performed by: NURSE ANESTHETIST, CERTIFIED REGISTERED

## 2023-10-26 PROCEDURE — 25810000003 LACTATED RINGERS PER 1000 ML: Performed by: NURSE PRACTITIONER

## 2023-10-26 PROCEDURE — 85025 COMPLETE CBC W/AUTO DIFF WBC: CPT

## 2023-10-26 PROCEDURE — 25810000003 LACTATED RINGERS PER 1000 ML: Performed by: ANESTHESIOLOGY

## 2023-10-26 PROCEDURE — 52356 CYSTO/URETERO W/LITHOTRIPSY: CPT | Performed by: UROLOGY

## 2023-10-26 PROCEDURE — 25010000002 ONDANSETRON PER 1 MG

## 2023-10-26 PROCEDURE — 74177 CT ABD & PELVIS W/CONTRAST: CPT

## 2023-10-26 PROCEDURE — 25010000002 HYDROMORPHONE 1 MG/ML SOLUTION: Performed by: NURSE ANESTHETIST, CERTIFIED REGISTERED

## 2023-10-26 PROCEDURE — 25010000002 SUGAMMADEX 200 MG/2ML SOLUTION: Performed by: NURSE ANESTHETIST, CERTIFIED REGISTERED

## 2023-10-26 PROCEDURE — 96374 THER/PROPH/DIAG INJ IV PUSH: CPT

## 2023-10-26 PROCEDURE — C1758 CATHETER, URETERAL: HCPCS | Performed by: UROLOGY

## 2023-10-26 PROCEDURE — 82365 CALCULUS SPECTROSCOPY: CPT | Performed by: UROLOGY

## 2023-10-26 PROCEDURE — 83690 ASSAY OF LIPASE: CPT

## 2023-10-26 PROCEDURE — C2617 STENT, NON-COR, TEM W/O DEL: HCPCS | Performed by: UROLOGY

## 2023-10-26 DEVICE — URETERAL STENT
Type: IMPLANTABLE DEVICE | Site: URETER | Status: FUNCTIONAL
Brand: ASCERTA™

## 2023-10-26 RX ORDER — PROMETHAZINE HYDROCHLORIDE 12.5 MG/1
12.5 TABLET ORAL ONCE AS NEEDED
Status: DISCONTINUED | OUTPATIENT
Start: 2023-10-26 | End: 2023-10-26 | Stop reason: HOSPADM

## 2023-10-26 RX ORDER — ONDANSETRON 4 MG/1
4 TABLET, FILM COATED ORAL ONCE AS NEEDED
Status: DISCONTINUED | OUTPATIENT
Start: 2023-10-26 | End: 2023-10-26 | Stop reason: HOSPADM

## 2023-10-26 RX ORDER — LEVOFLOXACIN 5 MG/ML
500 INJECTION, SOLUTION INTRAVENOUS
Status: COMPLETED | OUTPATIENT
Start: 2023-10-26 | End: 2023-10-26

## 2023-10-26 RX ORDER — ONDANSETRON 2 MG/ML
4 INJECTION INTRAMUSCULAR; INTRAVENOUS ONCE AS NEEDED
Status: DISCONTINUED | OUTPATIENT
Start: 2023-10-26 | End: 2023-10-26 | Stop reason: HOSPADM

## 2023-10-26 RX ORDER — OXYCODONE HYDROCHLORIDE 5 MG/1
5 TABLET ORAL
Status: DISCONTINUED | OUTPATIENT
Start: 2023-10-26 | End: 2023-10-26 | Stop reason: HOSPADM

## 2023-10-26 RX ORDER — MIDAZOLAM HYDROCHLORIDE 2 MG/2ML
2 INJECTION, SOLUTION INTRAMUSCULAR; INTRAVENOUS ONCE
Status: COMPLETED | OUTPATIENT
Start: 2023-10-26 | End: 2023-10-26

## 2023-10-26 RX ORDER — HYDROCODONE BITARTRATE AND ACETAMINOPHEN 5; 325 MG/1; MG/1
1 TABLET ORAL EVERY 6 HOURS PRN
Qty: 12 TABLET | Refills: 0 | Status: SHIPPED | OUTPATIENT
Start: 2023-10-26

## 2023-10-26 RX ORDER — ONDANSETRON 2 MG/ML
4 INJECTION INTRAMUSCULAR; INTRAVENOUS ONCE
Status: COMPLETED | OUTPATIENT
Start: 2023-10-26 | End: 2023-10-26

## 2023-10-26 RX ORDER — ACETAMINOPHEN 325 MG/1
650 TABLET ORAL ONCE
Status: DISCONTINUED | OUTPATIENT
Start: 2023-10-26 | End: 2023-10-26 | Stop reason: HOSPADM

## 2023-10-26 RX ORDER — ONDANSETRON 2 MG/ML
INJECTION INTRAMUSCULAR; INTRAVENOUS AS NEEDED
Status: DISCONTINUED | OUTPATIENT
Start: 2023-10-26 | End: 2023-10-26 | Stop reason: SURG

## 2023-10-26 RX ORDER — KETOROLAC TROMETHAMINE 15 MG/ML
15 INJECTION, SOLUTION INTRAMUSCULAR; INTRAVENOUS ONCE
Status: COMPLETED | OUTPATIENT
Start: 2023-10-26 | End: 2023-10-26

## 2023-10-26 RX ORDER — PROMETHAZINE HYDROCHLORIDE 25 MG/1
25 SUPPOSITORY RECTAL ONCE AS NEEDED
Status: DISCONTINUED | OUTPATIENT
Start: 2023-10-26 | End: 2023-10-26 | Stop reason: HOSPADM

## 2023-10-26 RX ORDER — LIDOCAINE HYDROCHLORIDE 20 MG/ML
INJECTION, SOLUTION EPIDURAL; INFILTRATION; INTRACAUDAL; PERINEURAL AS NEEDED
Status: DISCONTINUED | OUTPATIENT
Start: 2023-10-26 | End: 2023-10-26 | Stop reason: SURG

## 2023-10-26 RX ORDER — ACETAMINOPHEN 500 MG
1000 TABLET ORAL ONCE
Status: COMPLETED | OUTPATIENT
Start: 2023-10-26 | End: 2023-10-26

## 2023-10-26 RX ORDER — SODIUM CHLORIDE, SODIUM LACTATE, POTASSIUM CHLORIDE, CALCIUM CHLORIDE 600; 310; 30; 20 MG/100ML; MG/100ML; MG/100ML; MG/100ML
9 INJECTION, SOLUTION INTRAVENOUS CONTINUOUS PRN
Status: DISCONTINUED | OUTPATIENT
Start: 2023-10-26 | End: 2023-10-26 | Stop reason: HOSPADM

## 2023-10-26 RX ORDER — FENTANYL CITRATE 50 UG/ML
INJECTION, SOLUTION INTRAMUSCULAR; INTRAVENOUS AS NEEDED
Status: DISCONTINUED | OUTPATIENT
Start: 2023-10-26 | End: 2023-10-26 | Stop reason: SURG

## 2023-10-26 RX ORDER — LEVOFLOXACIN 5 MG/ML
INJECTION, SOLUTION INTRAVENOUS AS NEEDED
Status: DISCONTINUED | OUTPATIENT
Start: 2023-10-26 | End: 2023-10-26 | Stop reason: SURG

## 2023-10-26 RX ORDER — PROMETHAZINE HYDROCHLORIDE 12.5 MG/1
25 TABLET ORAL ONCE AS NEEDED
Status: DISCONTINUED | OUTPATIENT
Start: 2023-10-26 | End: 2023-10-26 | Stop reason: HOSPADM

## 2023-10-26 RX ORDER — LORATADINE 10 MG/1
10 TABLET ORAL DAILY
COMMUNITY

## 2023-10-26 RX ORDER — SODIUM CHLORIDE, SODIUM LACTATE, POTASSIUM CHLORIDE, CALCIUM CHLORIDE 600; 310; 30; 20 MG/100ML; MG/100ML; MG/100ML; MG/100ML
100 INJECTION, SOLUTION INTRAVENOUS CONTINUOUS
Status: DISCONTINUED | OUTPATIENT
Start: 2023-10-26 | End: 2023-10-26 | Stop reason: HOSPADM

## 2023-10-26 RX ORDER — HYDROCODONE BITARTRATE AND ACETAMINOPHEN 5; 325 MG/1; MG/1
1 TABLET ORAL ONCE AS NEEDED
Status: DISCONTINUED | OUTPATIENT
Start: 2023-10-26 | End: 2023-10-26 | Stop reason: HOSPADM

## 2023-10-26 RX ORDER — DEXAMETHASONE SODIUM PHOSPHATE 4 MG/ML
INJECTION, SOLUTION INTRA-ARTICULAR; INTRALESIONAL; INTRAMUSCULAR; INTRAVENOUS; SOFT TISSUE AS NEEDED
Status: DISCONTINUED | OUTPATIENT
Start: 2023-10-26 | End: 2023-10-26 | Stop reason: SURG

## 2023-10-26 RX ORDER — MAGNESIUM HYDROXIDE 1200 MG/15ML
LIQUID ORAL AS NEEDED
Status: DISCONTINUED | OUTPATIENT
Start: 2023-10-26 | End: 2023-10-26 | Stop reason: HOSPADM

## 2023-10-26 RX ORDER — MEPERIDINE HYDROCHLORIDE 25 MG/ML
12.5 INJECTION INTRAMUSCULAR; INTRAVENOUS; SUBCUTANEOUS
Status: DISCONTINUED | OUTPATIENT
Start: 2023-10-26 | End: 2023-10-26 | Stop reason: HOSPADM

## 2023-10-26 RX ORDER — PROPOFOL 10 MG/ML
VIAL (ML) INTRAVENOUS AS NEEDED
Status: DISCONTINUED | OUTPATIENT
Start: 2023-10-26 | End: 2023-10-26 | Stop reason: SURG

## 2023-10-26 RX ORDER — ROCURONIUM BROMIDE 10 MG/ML
INJECTION, SOLUTION INTRAVENOUS AS NEEDED
Status: DISCONTINUED | OUTPATIENT
Start: 2023-10-26 | End: 2023-10-26 | Stop reason: SURG

## 2023-10-26 RX ADMIN — ONDANSETRON 4 MG: 2 INJECTION INTRAMUSCULAR; INTRAVENOUS at 08:12

## 2023-10-26 RX ADMIN — SUGAMMADEX 200 MG: 100 INJECTION, SOLUTION INTRAVENOUS at 17:36

## 2023-10-26 RX ADMIN — PROPOFOL 200 MG: 10 INJECTION, EMULSION INTRAVENOUS at 17:06

## 2023-10-26 RX ADMIN — FENTANYL CITRATE 100 MCG: 50 INJECTION, SOLUTION INTRAMUSCULAR; INTRAVENOUS at 17:11

## 2023-10-26 RX ADMIN — ROCURONIUM BROMIDE 70 MG: 50 INJECTION INTRAVENOUS at 17:07

## 2023-10-26 RX ADMIN — DEXAMETHASONE SODIUM PHOSPHATE 8 MG: 4 INJECTION, SOLUTION INTRAMUSCULAR; INTRAVENOUS at 17:20

## 2023-10-26 RX ADMIN — LIDOCAINE HYDROCHLORIDE 50 MG: 20 INJECTION, SOLUTION EPIDURAL; INFILTRATION; INTRACAUDAL; PERINEURAL at 17:06

## 2023-10-26 RX ADMIN — ONDANSETRON 4 MG: 2 INJECTION INTRAMUSCULAR; INTRAVENOUS at 17:20

## 2023-10-26 RX ADMIN — IOPAMIDOL 100 ML: 755 INJECTION, SOLUTION INTRAVENOUS at 08:50

## 2023-10-26 RX ADMIN — SODIUM CHLORIDE, POTASSIUM CHLORIDE, SODIUM LACTATE AND CALCIUM CHLORIDE: 600; 310; 30; 20 INJECTION, SOLUTION INTRAVENOUS at 16:58

## 2023-10-26 RX ADMIN — KETOROLAC TROMETHAMINE 15 MG: 15 INJECTION, SOLUTION INTRAMUSCULAR; INTRAVENOUS at 08:12

## 2023-10-26 RX ADMIN — ACETAMINOPHEN 1000 MG: 500 TABLET ORAL at 15:11

## 2023-10-26 RX ADMIN — OXYCODONE HYDROCHLORIDE 5 MG: 5 TABLET ORAL at 17:58

## 2023-10-26 RX ADMIN — SODIUM CHLORIDE, POTASSIUM CHLORIDE, SODIUM LACTATE AND CALCIUM CHLORIDE 100 ML/HR: 600; 310; 30; 20 INJECTION, SOLUTION INTRAVENOUS at 15:12

## 2023-10-26 RX ADMIN — LEVOFLOXACIN 500 MG: 5 INJECTION, SOLUTION INTRAVENOUS at 17:05

## 2023-10-26 RX ADMIN — MIDAZOLAM HYDROCHLORIDE 2 MG: 1 INJECTION, SOLUTION INTRAMUSCULAR; INTRAVENOUS at 16:41

## 2023-10-26 RX ADMIN — SODIUM CHLORIDE, POTASSIUM CHLORIDE, SODIUM LACTATE AND CALCIUM CHLORIDE 100 ML/HR: 600; 310; 30; 20 INJECTION, SOLUTION INTRAVENOUS at 12:02

## 2023-10-26 RX ADMIN — LEVOFLOXACIN 500 MG: 5 INJECTION, SOLUTION INTRAVENOUS at 12:07

## 2023-10-26 RX ADMIN — HYDROMORPHONE HYDROCHLORIDE 0.5 MG: 1 INJECTION, SOLUTION INTRAMUSCULAR; INTRAVENOUS; SUBCUTANEOUS at 17:56

## 2023-10-26 NOTE — OP NOTE
URETEROSCOPY LASER LITHOTRIPSY WITH STENT INSERTION  Procedure Report    Patient Name:  Rehan Ramirez  YOB: 1955    Date of Surgery:  10/26/2023      Pre-op Diagnosis:   Right ureteral stone [N20.1]       Postop diagnosis:    Same    Procedure/CPT® Codes:      Procedure(s):      Cystoscopy  Right ureteroscopy with laser and basket extraction of stone  Right ureteral stent placement 6 x 28 with string left on    Staff:  Surgeon(s):  James Del Rio MD         Anesthesia: General    Estimated Blood Loss: minimal    Implants:    Implant Name Type Inv. Item Serial No.  Lot No. LRB No. Used Action   STNT URETRL ASCERTA 6F 28CM - [id number] Stent STNT URETRL ASCERTA 6F 28CM  [employer] [id number] Right 1 Implanted       Specimen:          Specimens       ID Source Type Tests Collected By Collected At Frozen?    1 Ureter, Right Calculus STONE ANALYSIS   James Del Rio MD 10/26/23 0804     Description: right renal calculus    This specimen was not marked as sent.                Findings:       4 cm prostate  Normal bladder      All stones removed from the right    Stent placed with string      Complications: none    Description of Procedure:       After informed consent patient taken to the operating room.  Patient was laid supine and placed under general anesthesia by the anesthesia team.  At this point patient was placed in dorsal lithotomy position and prepped and draped in normal sterile fashion.  A multidisciplinary timeout was undertaken documenting the correct patient site and procedure.  At this point a 22 rigid cystoscope was placed into the urethra . Bladder was normal.  At this point a Glidewire was placed up the right     ureter without any issue under fluoroscopic guidance.  I then placed a dual-lumen catheter and a stiff wire alongside the Glidewire under fluoroscopic guidance.  The dual-lumen was removed and a ureteral access sheath was placed into the distal  ureter without any problem.  I removed the obturator and wire and placed a flexible ureteroscope up the ueter.  The stone was identified.  Stone was lasered into multiple small fragments with a 272 µm laser fiber and then these pieces were basketed out with a no tip nitinol basket.  I then took the flexible ureteroscope up and check the rest of the ureter and the upper collecting system.  There were no further stones.  Brought the actual sheath out under direct vision and there was no further stones.    Right side was free of stones.  A 6 x 28 ureteral stent was then placed over the Glidewire through a rigid cystoscope without issue and had a good curl in the bladder under direct vision and a good curl in the   Right renal pelvis under fluoroscopy.  Bladder was drained.  Patient tolerated the procedure well, he was taken to the postanesthesia care unit without issue.        Stent placed with string      James Del Rio MD     Date: 10/26/2023  Time: 17:37 EDT

## 2023-10-26 NOTE — ANESTHESIA PREPROCEDURE EVALUATION
Anesthesia Evaluation     Patient summary reviewed and Nursing notes reviewed   no history of anesthetic complications:   NPO Solid Status: > 8 hours  NPO Liquid Status: > 2 hours           Airway   Mallampati: II  TM distance: >3 FB  Neck ROM: full  No difficulty expected  Dental      Pulmonary - negative pulmonary ROS and normal exam    breath sounds clear to auscultation  Cardiovascular - normal exam  Exercise tolerance: good (4-7 METS)    Rhythm: regular  Rate: normal    (+) hypertension, hyperlipidemia      Neuro/Psych- negative ROS  GI/Hepatic/Renal/Endo    (+) GERD well controlled, renal disease- stones    Musculoskeletal     Abdominal    Substance History - negative use     OB/GYN negative ob/gyn ROS         Other   arthritis,     ROS/Med Hx Other: PAT Nursing Notes unavailable.               Anesthesia Plan    ASA 2     general     (Patient understands anesthesia not responsible for dental damage.)  intravenous induction     Anesthetic plan, risks, benefits, and alternatives have been provided, discussed and informed consent has been obtained with: patient.  Pre-procedure education provided  Plan discussed with CRNA.    CODE STATUS:

## 2023-10-26 NOTE — DISCHARGE INSTRUCTIONS
DISCHARGE INSTRUCTIONS  Extracorporeal Shock Wave Lithotripsy (ESWL)/ Ureteroscopy Lasertripsy      For your surgery you had:  General anesthesia (you may have a sore throat for the first 24 hours)  You may experience dizziness, drowsiness, or lightheadedness for several hours following surgery.  Do not stay alone today or tonight.  Limit your activity for 24 hours.  You should not drive or operate machinery, drink alcohol, or sign legally binding documents for 24 hours or while you are taking pain medication.  Resume your diet slowly.  Follow any special dietary instructions you may have been given by your doctor.     NOTIFY YOUR DOCTOR IF YOU EXPERIENCE ANY OF THE FOLLOWING:  Temperature greater than 101 degrees Fahrenheit  Shaking Chills  Redness or excessive drainage from incision  Nausea, vomiting and/or pain that is not controlled by prescribed medications  Increase in bleeding or bleeding that is excessive  Unable to urinate in 6 hours after surgery  If unable to reach your doctor, please go to the closest Emergency Room  Strain urine if instructed by physician.  Collect any fragments and take with you on your scheduled appointment. You may pass stone pieces or small blood clots.  Blood in your urine is normal.  It could be light pink to cherry color.  Urine will be bloody for several days.  Drink 6-8 glasses of fluid each day to assist with passing of stone fragments.  Back pain is common.  It may feel like a dull ache or back spasm.  If you have a stent, it must be managed by your urologist.  Do NOT forget.      SPECIAL INSTRUCTIONS:  Pull stent by string on Monday morning.      Last dose of pain medication was given at:   Tylenol (1000mg) last at 3:11pm. May take tylenol next at 9:11pm if needed.  May take ibuprofen next at anytime if able and needed.  Oxycodone last at 6pm.

## 2023-10-26 NOTE — H&P
History and Physical    Patient Name:  Rehan Ramirez  YOB: 1955  [id number]    Referring Provider: Dr. Lake      Patient Care Team:  Tamia Esparza APRN as PCP - General (Family Medicine)  James Del Rio MD as Consulting Physician (Urology)    Reason for consult/Chief complaint:  abdominal pain and vomiting    Subjective .     History of present illness:  Rehan Ramirez is a 67 y.o. who presents to the ED at [facility] today for right flank pain, nausea, and vomiting that started this morning.  He denies fever, chills, hematuria, diarrhea, or constipation.  He had a CT scan of the abdomen and pelvis that indicates he has a 7mm stone in the proximal right ureter.  WBC is 7.6. VSS, he is afebrile.       History:  Past Medical History:   Diagnosis Date    Acid reflux     Arthritis     Diverticulitis     High cholesterol     Hypertension     Kidney stone     Right ureteral stone 10/26/2023       Past Surgical History:   Procedure Laterality Date    APPENDECTOMY      BACK SURGERY      COLONOSCOPY      HERNIA REPAIR      TONSILLECTOMY         Family History   Problem Relation Age of Onset    Heart disease Father     Cancer Mother     Diabetes Maternal Grandmother        Social History     Tobacco Use    Smoking status: Never    Smokeless tobacco: Never   Vaping Use    Vaping Use: Never used   Substance Use Topics    Alcohol use: Defer       Review of Systems:  A complete ROS was performed and all are negative except for what is documented in the HPI.    MEDS:  Prior to Admission medications    Medication Sig Start Date End Date Taking? Authorizing Provider   diclofenac (VOLTAREN) 75 MG EC tablet Take 1 tablet by mouth 2 (Two) Times a Day As Needed. 1/27/22   ProviderGinette MD   lisinopril (PRINIVIL,ZESTRIL) 5 MG tablet lisinopril 5 mg oral tablet take 1 tablet (5 mg) by oral route once daily   Active    ProviderGinette MD   lisinopril-hydrochlorothiazide (PRINZIDE,ZESTORETIC) 10-12.5  "MG per tablet Take 1 tablet by mouth Daily. 1/27/22   Ginette Loco MD   metoprolol tartrate (LOPRESSOR) 50 MG tablet Take 1 tablet by mouth Daily. 1/27/22   Ginette Loco MD   omeprazole (priLOSEC) 40 MG capsule Take 1 capsule by mouth Daily. 1/27/22   Ginette Loco MD   pravastatin (PRAVACHOL) 10 MG tablet Take 1 tablet by mouth every night at bedtime. 1/27/22   Ginette Loco MD   tamsulosin (FLOMAX) 0.4 MG capsule 24 hr capsule Take 1 capsule by mouth Daily. 7/13/23   Rosibel Cho, LINDA        levoFLOXacin, 500 mg, Intravenous, On Call to OR         lactated ringers, 100 mL/hr, Last Rate: 100 mL/hr (10/26/23 1202)         Allergies:  Sulfa antibiotics and Sulfamethoxazole-trimethoprim    Objective         Physical Exam:      Constitutional:  well nourished, no acute distress, appear stated age /89 (BP Location: Left arm)   Pulse 65   Temp 97.9 °F (36.6 °C) (Oral)   Resp 20   Ht 185.4 cm (73\")   Wt 104 kg (229 lb 4.5 oz)   SpO2 93%   BMI 30.25 kg/m²    Eyes:  anicteric sclerae, moist conjunctivae, no lid lag, PERRLA  ENMT:  oropharynx clear, moist mucous membranes, good dentition  Neck:   full ROM, trachea midline, no thyromegaly  Cardiovascular: RRR, S1 and S2 present, no MRG, heart rate 65, no pedal edema  Respiratory: lungs CTA, respirations even and unlabored  GI:  Abdomen soft, nontender, nondistended, no HSM     Skin:  warm and dry, normal turgor, no rashes  Psychiatric:  alert and oriented x3, intact judgment and insight, cooperative         Results Review: I have reviewed the patient's labs and imaging including CBC, BMP, CT of abdomen and pelvis    LABS/IMAGING:    WBC   Date Value Ref Range Status   10/26/2023 7.68 3.40 - 10.80 10*3/mm3 Final     RBC   Date Value Ref Range Status   10/26/2023 4.73 4.14 - 5.80 10*6/mm3 Final     Hemoglobin   Date Value Ref Range Status   10/26/2023 14.1 13.0 - 17.7 g/dL Final     Hematocrit   Date Value Ref Range Status "   10/26/2023 43.1 37.5 - 51.0 % Final     MCV   Date Value Ref Range Status   10/26/2023 91.1 79.0 - 97.0 fL Final     MCH   Date Value Ref Range Status   10/26/2023 29.8 26.6 - 33.0 pg Final     MCHC   Date Value Ref Range Status   10/26/2023 32.7 31.5 - 35.7 g/dL Final     RDW   Date Value Ref Range Status   10/26/2023 13.2 12.3 - 15.4 % Final     RDW-SD   Date Value Ref Range Status   10/26/2023 43.8 37.0 - 54.0 fl Final     MPV   Date Value Ref Range Status   10/26/2023 8.8 6.0 - 12.0 fL Final     Platelets   Date Value Ref Range Status   10/26/2023 251 140 - 450 10*3/mm3 Final     Neutrophil %   Date Value Ref Range Status   10/26/2023 60.7 42.7 - 76.0 % Final     Lymphocyte %   Date Value Ref Range Status   10/26/2023 26.0 19.6 - 45.3 % Final     Monocyte %   Date Value Ref Range Status   10/26/2023 9.4 5.0 - 12.0 % Final     Eosinophil %   Date Value Ref Range Status   10/26/2023 2.3 0.3 - 6.2 % Final     Basophil %   Date Value Ref Range Status   10/26/2023 0.7 0.0 - 1.5 % Final     Immature Grans %   Date Value Ref Range Status   10/26/2023 0.9 (H) 0.0 - 0.5 % Final     Neutrophils, Absolute   Date Value Ref Range Status   10/26/2023 4.66 1.70 - 7.00 10*3/mm3 Final     Lymphocytes, Absolute   Date Value Ref Range Status   10/26/2023 2.00 0.70 - 3.10 10*3/mm3 Final     Monocytes, Absolute   Date Value Ref Range Status   10/26/2023 0.72 0.10 - 0.90 10*3/mm3 Final     Eosinophils, Absolute   Date Value Ref Range Status   10/26/2023 0.18 0.00 - 0.40 10*3/mm3 Final     Basophils, Absolute   Date Value Ref Range Status   10/26/2023 0.05 0.00 - 0.20 10*3/mm3 Final     Immature Grans, Absolute   Date Value Ref Range Status   10/26/2023 0.07 (H) 0.00 - 0.05 10*3/mm3 Final     nRBC   Date Value Ref Range Status   10/26/2023 0.0 0.0 - 0.2 /100 WBC Final        Basic Metabolic Panel    Sodium Sodium   Date Value Ref Range Status   10/26/2023 140 136 - 145 mmol/L Final      Potassium Potassium   Date Value Ref Range  "Status   10/26/2023 4.5 3.5 - 5.2 mmol/L Final      Chloride Chloride   Date Value Ref Range Status   10/26/2023 104 98 - 107 mmol/L Final      Bicarbonate No results found for: \"PLASMABICARB\"   BUN BUN   Date Value Ref Range Status   10/26/2023 23 8 - 23 mg/dL Final      Creatinine Creatinine   Date Value Ref Range Status   10/26/2023 0.99 0.76 - 1.27 mg/dL Final      Calcium Calcium   Date Value Ref Range Status   10/26/2023 9.3 8.6 - 10.5 mg/dL Final      Glucose      No components found for: \"GLUCOSE.*\"        Lab Results   Component Value Date    GLUCOSE 140 (H) 10/26/2023    BUN 23 10/26/2023    CREATININE 0.99 10/26/2023    BCR 23.2 10/26/2023    K 4.5 10/26/2023    CO2 28.1 10/26/2023    CALCIUM 9.3 10/26/2023    ALBUMIN 4.0 10/26/2023    AST 43 (H) 10/26/2023    ALT 77 (H) 10/26/2023          CT Abdomen Pelvis With Contrast    Result Date: 10/26/2023  PROCEDURE: CT ABDOMEN PELVIS W CONTRAST  COMPARISON: [facility], CT, CT ABDOMEN PELVIS W WO CONTRAST, 2/23/2022, 11:51.  INDICATIONS: RUQ pain, right flank pain  TECHNIQUE: After obtaining the patient's consent, CT images were created with non-ionic intravenous contrast material.   PROTOCOL:   Standard imaging protocol performed    RADIATION:   DLP: 1724.8mGy*cm   Automated exposure control was utilized to minimize radiation dose. CONTRAST: 100cc Isovue 370 I.V.  FINDINGS:  There is a 7 mm stone in the proximal right ureter best demonstrated on coronal image 78 of series 202.  There is no significant obstructive uropathy.  This could be the etiology for the patient's pain.  There is a benign cyst in the anterior cortex of the right kidney.  There are few tiny parapelvic cysts in the inferior pole of the left kidney.  The prostate gland, seminal vesicles, and urinary bladder are normal.  The patient has a large fat density left inguinal hernia similar to the previous exam.  There is no strandy density in the herniated fat to indicate " ischemia or fat necrosis.  The liver, gallbladder, pancreas, adrenal glands, and spleen are normal.    There is extensive colonic diverticulosis mainly involving the transverse, descending, and sigmoid colon with no evidence of acute diverticulitis.  There are no dilated loops of bowel to indicate an obstructive process.  The appendix is not seen well.  It is either surgically absent or small in size.  There are atherosclerotic vascular calcifications throughout both the abdomen and pelvis.  There are no enlarged lymph nodes or abnormal fluid collections.  There is mild bilateral basilar subsegmental atelectasis.  There are no suspicious osteolytic or sclerotic lesions within the bony structures.  There are degenerative changes within the spine, sacroiliac joints, and hip joints.      Impression:   1. 7 mm stone in the proximal right ureter with no definite obstructive uropathy. 2. Bilateral renal cysts. 3. Large fat density left inguinal hernia.  There is no evidence of ischemia or fat necrosis. 4. Extensive colonic diverticulosis without evidence of acute diverticulitis. 5. The appendix is not seen well.  It is either surgically absent or small in size. 6. Additional incidental findings as noted above.     SHANI DALTON MD       Electronically Signed and Approved By: SHANI DALTON MD on 10/26/2023 at 9:17                   Assessment & Plan         Right ureteral stone    NPO  Case request and consent  right ureterosocpy laser lithotripsy and right ureteral stent placement by Dr. Del Rio R/B/A's discussed   Levaquin 500 mg IV on call to OR        Addendum      10/26/2023 UA - no bacteria RBCs  10/26/23 CT abdomen/pelvis with - 7 mm right mid ureteral stone.  No other stones.  Images reviewed.  Bilateral renal cyst, large fat density left inguinal hernia.    Patient having severe pain after discussion we will place him on for cystoscopy with right ureteroscopy with laser and right renal stent placement.  Risks and  benefits were discussed including bleeding, infection and damage to the urinary system.  We also discussed the risk of anesthesia up to and including death.  Patient voiced understanding and would like to proceed.    Electronically signed by James Del Rio MD, 10/26/23, 3:29 PM EDT.

## 2023-10-26 NOTE — ED PROVIDER NOTES
Time: 12:28 PM EDT  Date of encounter:  10/26/2023  Independent Historian/Clinical History and Information was obtained by:   Patient    History is limited by: N/A    Chief Complaint: Right flank pain      History of Present Illness:  Patient is a 67 y.o. year old male who presents to the emergency department for evaluation of right-sided abdominal pain that radiates to the right flank that began this morning per the patient.  Patient states he had mild dysuria.  Patient states he has had several episodes of nausea/vomiting.  Patient denies fever.  Patient denies chest pain or shortness of air.    HPI    Patient Care Team  Primary Care Provider: Tamia Esparza APRN    Past Medical History:     Allergies   Allergen Reactions    Sulfa Antibiotics Hives    Sulfamethoxazole-Trimethoprim Rash     Past Medical History:   Diagnosis Date    Acid reflux     Arthritis     Diverticulitis     High cholesterol     Hypertension     Kidney stone     Right ureteral stone 10/26/2023     Past Surgical History:   Procedure Laterality Date    APPENDECTOMY      BACK SURGERY      COLONOSCOPY      HERNIA REPAIR      TONSILLECTOMY       Family History   Problem Relation Age of Onset    Heart disease Father     Cancer Mother     Diabetes Maternal Grandmother        Home Medications:  Prior to Admission medications    Medication Sig Start Date End Date Taking? Authorizing Provider   diclofenac (VOLTAREN) 75 MG EC tablet Take 1 tablet by mouth 2 (Two) Times a Day As Needed. 1/27/22   Ginette Loco MD   lisinopril (PRINIVIL,ZESTRIL) 5 MG tablet lisinopril 5 mg oral tablet take 1 tablet (5 mg) by oral route once daily   Active    ProviderGinette MD   lisinopril-hydrochlorothiazide (PRINZIDE,ZESTORETIC) 10-12.5 MG per tablet Take 1 tablet by mouth Daily. 1/27/22   Ginette Loco MD   metoprolol tartrate (LOPRESSOR) 50 MG tablet Take 1 tablet by mouth Daily. 1/27/22   Ginette Loco MD   omeprazole (priLOSEC) 40 MG  "capsule Take 1 capsule by mouth Daily. 1/27/22   Provider, MD Ginette   pravastatin (PRAVACHOL) 10 MG tablet Take 1 tablet by mouth every night at bedtime. 1/27/22   Provider, MD Ginette   tamsulosin (FLOMAX) 0.4 MG capsule 24 hr capsule Take 1 capsule by mouth Daily. 7/13/23   Rosibel Cho APRN        Social History:   Social History     Tobacco Use    Smoking status: Never    Smokeless tobacco: Never   Vaping Use    Vaping Use: Never used   Substance Use Topics    Alcohol use: Defer         Review of Systems:  Review of Systems   Constitutional:  Negative for chills and fever.   HENT:  Negative for congestion, rhinorrhea and sore throat.    Eyes:  Negative for pain and visual disturbance.   Respiratory:  Negative for apnea, cough, chest tightness and shortness of breath.    Cardiovascular:  Negative for chest pain and palpitations.   Gastrointestinal:  Positive for abdominal pain, nausea and vomiting. Negative for diarrhea.   Genitourinary:  Positive for dysuria and flank pain. Negative for difficulty urinating.   Musculoskeletal:  Negative for joint swelling and myalgias.   Skin:  Negative for color change.   Neurological:  Negative for seizures and headaches.   Psychiatric/Behavioral: Negative.     All other systems reviewed and are negative.       Physical Exam:  /89 (BP Location: Left arm)   Pulse 65   Temp 97.9 °F (36.6 °C) (Oral)   Resp 20   Ht 185.4 cm (73\")   Wt 104 kg (229 lb 4.5 oz)   SpO2 93%   BMI 30.25 kg/m²     Physical Exam  Vitals and nursing note reviewed.   Constitutional:       General: He is not in acute distress.     Appearance: Normal appearance. He is not toxic-appearing.   HENT:      Head: Normocephalic and atraumatic.      Jaw: There is normal jaw occlusion.   Eyes:      General: Lids are normal.      Extraocular Movements: Extraocular movements intact.      Conjunctiva/sclera: Conjunctivae normal.      Pupils: Pupils are equal, round, and reactive to light. "   Cardiovascular:      Rate and Rhythm: Normal rate and regular rhythm.      Pulses: Normal pulses.      Heart sounds: Normal heart sounds.   Pulmonary:      Effort: Pulmonary effort is normal. No respiratory distress.      Breath sounds: Normal breath sounds. No wheezing or rhonchi.   Abdominal:      General: Abdomen is flat.      Palpations: Abdomen is soft.      Tenderness: There is abdominal tenderness in the right upper quadrant and right lower quadrant. There is right CVA tenderness. There is no guarding or rebound.   Musculoskeletal:         General: Normal range of motion.      Cervical back: Normal range of motion and neck supple.      Right lower leg: No edema.      Left lower leg: No edema.   Skin:     General: Skin is warm and dry.   Neurological:      Mental Status: He is alert and oriented to person, place, and time. Mental status is at baseline.   Psychiatric:         Mood and Affect: Mood normal.                  Procedures:  Procedures      Medical Decision Making:      Comorbidities that affect care:    Hypertension, kidney stone, diverticulitis    External Notes reviewed:          The following orders were placed and all results were independently analyzed by me:  Orders Placed This Encounter   Procedures    Urine Culture - Urine,    CT Abdomen Pelvis With Contrast    Comprehensive Metabolic Panel    Lipase    [place] Draw    Urinalysis With Culture If Indicated - Urine, Clean Catch    CBC Auto Differential    Urinalysis, Microscopic Only - Urine, Clean Catch    NPO Diet NPO Type: Strict NPO    Verify / Perform Chlorhexidine Skin Prep    Verify / Perform Chlorhexidine Skin Prep if Indicated (If Not Already Completed)    Perform Chlorhexidine Skin Prep Night Before and Morning of Procedure    Obtain Informed Consent    CBC & Differential    Green Top (Gel)    Lavender Top    Gold Top - SST    Light Blue Top       Medications Given in the Emergency Department:  Medications   lactated ringers  infusion (100 mL/hr Intravenous New Bag 10/26/23 1202)   levoFLOXacin (LEVAQUIN) 500 mg/100 mL D5W (premix) (LEVAQUIN) 500 mg (500 mg Intravenous New Bag 10/26/23 1207)   ondansetron (ZOFRAN) injection 4 mg (4 mg Intravenous Given 10/26/23 0812)   ketorolac (TORADOL) injection 15 mg (15 mg Intravenous Given 10/26/23 0812)   iopamidol (ISOVUE-370) 76 % injection 100 mL (100 mL Intravenous Given 10/26/23 0850)        ED Course:         Labs:    Lab Results (last 24 hours)       Procedure Component Value Units Date/Time    CBC & Differential [[id number]]  (Abnormal) Collected: 10/26/23 0811    Specimen: Blood Updated: 10/26/23 0820    Narrative:      The following orders were created for panel order CBC & Differential.  Procedure                               Abnormality         Status                     ---------                               -----------         ------                     CBC Auto Differential[[id number]]        Abnormal            Final result                 Please view results for these tests on the individual orders.    Comprehensive Metabolic Panel [[id number]]  (Abnormal) Collected: 10/26/23 0811    Specimen: Blood Updated: 10/26/23 0837     Glucose 140 mg/dL      BUN 23 mg/dL      Creatinine 0.99 mg/dL      Sodium 140 mmol/L      Potassium 4.5 mmol/L      Chloride 104 mmol/L      CO2 28.1 mmol/L      Calcium 9.3 mg/dL      Total Protein 7.0 g/dL      Albumin 4.0 g/dL      ALT (SGPT) 77 U/L      AST (SGOT) 43 U/L      Alkaline Phosphatase 96 U/L      Total Bilirubin 0.4 mg/dL      Globulin 3.0 gm/dL      A/G Ratio 1.3 g/dL      BUN/Creatinine Ratio 23.2     Anion Gap 7.9 mmol/L      eGFR 84.0 mL/min/1.73     Narrative:      GFR Normal >60  Chronic Kidney Disease <60  Kidney Failure <15      Lipase [[id number]]  (Normal) Collected: 10/26/23 0811    Specimen: Blood Updated: 10/26/23 0837     Lipase 19 U/L     CBC Auto Differential [[id number]]  (Abnormal) Collected: 10/26/23 0811    Specimen: Blood  Updated: 10/26/23 0820     WBC 7.68 10*3/mm3      RBC 4.73 10*6/mm3      Hemoglobin 14.1 g/dL      Hematocrit 43.1 %      MCV 91.1 fL      MCH 29.8 pg      MCHC 32.7 g/dL      RDW 13.2 %      RDW-SD 43.8 fl      MPV 8.8 fL      Platelets 251 10*3/mm3      Neutrophil % 60.7 %      Lymphocyte % 26.0 %      Monocyte % 9.4 %      Eosinophil % 2.3 %      Basophil % 0.7 %      Immature Grans % 0.9 %      Neutrophils, Absolute 4.66 10*3/mm3      Lymphocytes, Absolute 2.00 10*3/mm3      Monocytes, Absolute 0.72 10*3/mm3      Eosinophils, Absolute 0.18 10*3/mm3      Basophils, Absolute 0.05 10*3/mm3      Immature Grans, Absolute 0.07 10*3/mm3      nRBC 0.0 /100 WBC     Urinalysis With Culture If Indicated - Urine, Clean Catch [[id number]]  (Abnormal) Collected: 10/26/23 1026    Specimen: Urine, Clean Catch Updated: 10/26/23 1040     Color, UA Yellow     Appearance, UA Clear     pH, UA 7.0     Specific Gravity, UA >1.030     Glucose, UA Negative     Ketones, UA Negative     Bilirubin, UA Negative     Blood, UA Negative     Protein, UA Negative     Leuk Esterase, UA Trace     Nitrite, UA Negative     Urobilinogen, UA 1.0 E.U./dL    Narrative:      In absence of clinical symptoms, the presence of pyuria, bacteria, and/or nitrites on the urinalysis result does not correlate with infection.    Urinalysis, Microscopic Only - Urine, Clean Catch [[id number]]  (Abnormal) Collected: 10/26/23 1026    Specimen: Urine, Clean Catch Updated: 10/26/23 1040     RBC, UA 3-5 /HPF      WBC, UA 6-10 /HPF      Bacteria, UA None Seen /HPF      Squamous Epithelial Cells, UA 0-2 /HPF      Hyaline Casts, UA 0-2 /LPF      Methodology Automated Microscopy    Urine Culture - Urine, Urine, Clean Catch [[id number]] Collected: 10/26/23 1026    Specimen: Urine, Clean Catch Updated: 10/26/23 1040             Imaging:    CT Abdomen Pelvis With Contrast    Result Date: 10/26/2023  PROCEDURE: CT ABDOMEN PELVIS W CONTRAST  COMPARISON: [facility]  Imaging, CT, CT ABDOMEN PELVIS W WO CONTRAST, 2/23/2022, 11:51.  INDICATIONS: RUQ pain, right flank pain  TECHNIQUE: After obtaining the patient's consent, CT images were created with non-ionic intravenous contrast material.   PROTOCOL:   Standard imaging protocol performed    RADIATION:   DLP: 1724.8mGy*cm   Automated exposure control was utilized to minimize radiation dose. CONTRAST: 100cc Isovue 370 I.V.  FINDINGS:  There is a 7 mm stone in the proximal right ureter best demonstrated on coronal image 78 of series 202.  There is no significant obstructive uropathy.  This could be the etiology for the patient's pain.  There is a benign cyst in the anterior cortex of the right kidney.  There are few tiny parapelvic cysts in the inferior pole of the left kidney.  The prostate gland, seminal vesicles, and urinary bladder are normal.  The patient has a large fat density left inguinal hernia similar to the previous exam.  There is no strandy density in the herniated fat to indicate ischemia or fat necrosis.  The liver, gallbladder, pancreas, adrenal glands, and spleen are normal.    There is extensive colonic diverticulosis mainly involving the transverse, descending, and sigmoid colon with no evidence of acute diverticulitis.  There are no dilated loops of bowel to indicate an obstructive process.  The appendix is not seen well.  It is either surgically absent or small in size.  There are atherosclerotic vascular calcifications throughout both the abdomen and pelvis.  There are no enlarged lymph nodes or abnormal fluid collections.  There is mild bilateral basilar subsegmental atelectasis.  There are no suspicious osteolytic or sclerotic lesions within the bony structures.  There are degenerative changes within the spine, sacroiliac joints, and hip joints.        1. 7 mm stone in the proximal right ureter with no definite obstructive uropathy. 2. Bilateral renal cysts. 3. Large fat density left inguinal hernia.  There  is no evidence of ischemia or fat necrosis. 4. Extensive colonic diverticulosis without evidence of acute diverticulitis. 5. The appendix is not seen well.  It is either surgically absent or small in size. 6. Additional incidental findings as noted above.     SHANI DALTON MD       Electronically Signed and Approved By: SHANI DALTON MD on 10/26/2023 at 9:17                Differential Diagnosis and Discussion:    Abdominal Pain: Based on the patient's signs and symptoms, I considered abdominal aortic aneurysm, small bowel obstruction, pancreatitis, acute cholecystitis, acute appendecitis, peptic ulcer disease, gastritis, colitis, endocrine disorders, irritable bowel syndrome and other differential diagnosis an etiology of the patient's abdominal pain.  Flank Pain: Differential diagnosis includes but is not limited to kidney stones, pyelonephritis, musculoskeletal disorders, renal infarction, urinary tract infection, hydronephrosis, radiculopathy, aortic aneurysm, renal cell carcinoma.    All labs were reviewed and interpreted by me.  CT scan radiology impression was interpreted by me.    MDM     Amount and/or Complexity of Data Reviewed  Clinical lab tests: reviewed  Tests in the radiology section of CPT®: reviewed       CT abdomen pelvis shows 7 mm stone in the proximal right ureter with no definite obstructive uropathy.  The patient´s CBC that was reviewed and interpreted by me shows no abnormalities of critical concern. Of note, there is no anemia requiring a blood transfusion and the platelet count is acceptable.  Analysis shows no evidence of acute UTI.  I spoke with Dr. Del Rio who stated to make the patient n.p.o. and he will take the patient to the OR today.  He states his nurse practitioner will be down to place an H&P and see the patient in the meantime.        Patient Care Considerations:          Consultants/Shared Management Plan:    I spoke with Dr. Del Rio who agrees to take the patient to the OR today  for the kidney stone as he states it will not pass.    Social Determinants of Health:          Disposition and Care Coordination:    Send to OR/Endoscopy        Final diagnoses:   Kidney stone        ED Disposition       ED Disposition   Send to OR    Condition   --    Comment   --               This medical record created using voice recognition software.             Byron Villanueva PA-C  10/26/23 1231       Byron Villanueva PA-C  10/26/23 1232

## 2023-10-26 NOTE — ANESTHESIA POSTPROCEDURE EVALUATION
Patient: Rehan Ramirez    Procedure Summary       Date: 10/26/23 Room / Location: [facility] OR 05 / [facility] MAIN OR    Anesthesia Start: 1703 Anesthesia Stop: 1753    Procedure: URETEROSCOPY LASER LITHOTRIPSY WITH STENT INSERTION (Right) Diagnosis:       Right ureteral stone      (Right ureteral stone [N20.1])    Surgeons: Jamse Del Rio MD Provider: Reginald Steel MD    Anesthesia Type: general ASA Status: 2            Anesthesia Type: general    Vitals  Vitals Value Taken Time   /90 10/26/23 1815   Temp 36.7 °C (98 °F) 10/26/23 1750   Pulse 63 10/26/23 1816   Resp 17 10/26/23 1810   SpO2 94 % 10/26/23 1816   Vitals shown include unfiled device data.        Post Anesthesia Care and Evaluation    Patient location during evaluation: bedside  Patient participation: complete - patient participated  Level of consciousness: awake  Pain score: 2  Pain management: adequate    Airway patency: patent  PONV Status: none  Cardiovascular status: acceptable and stable  Respiratory status: acceptable  Hydration status: acceptable    Comments: An Anesthesiologist personally participated in the most demanding procedures (including induction and emergence if applicable) in the anesthesia plan, monitored the course of anesthesia administration at frequent intervals and remained physically present and available for immediate diagnosis and treatment of emergencies.

## 2023-10-27 ENCOUNTER — TELEPHONE (OUTPATIENT)
Dept: UROLOGY | Facility: CLINIC | Age: 68
End: 2023-10-27
Payer: MEDICARE

## 2023-10-27 DIAGNOSIS — N20.0 NEPHROLITHIASIS: Primary | ICD-10-CM

## 2023-10-27 NOTE — TELEPHONE ENCOUNTER
----- Message from Sylvester Franco RN sent at 10/27/2023  7:05 AM EDT -----  Regarding: FW: surgery   f/u  Pt needs to follow up in 1 mo with MERLENE prior. I will place MERLENE order now. Thanks   ----- Message -----  From: James Del Rio MD  Sent: 10/26/2023   5:40 PM EDT  To: Sylvester Franco RN  Subject: surgery   f/u                                     follow-up with renal ultrasound in 1 month

## 2023-10-29 LAB — BACTERIA SPEC AEROBE CULT: ABNORMAL

## 2023-10-30 LAB
LAB AP CASE REPORT: NORMAL
LAB AP CLINICAL INFORMATION: NORMAL
PATH REPORT.FINAL DX SPEC: NORMAL
PATH REPORT.GROSS SPEC: NORMAL

## 2023-10-31 NOTE — TELEPHONE ENCOUNTER
SPOKE TO PT AND SCHEDULED HIS APPT WITH DR LIVE FOR 12/5, PT AWARE THEY WILL CALL HIM TO SCHEDULE THE MERLENE.

## 2023-11-06 LAB
CALCIUM OXALATE DIHYDRATE MFR STONE IR: 30 %
COLOR STONE: NORMAL
COM MFR STONE: 70 %
COMPN STONE: NORMAL
LABORATORY COMMENT REPORT: NORMAL
Lab: NORMAL
Lab: NORMAL
PHOTO: NORMAL
SIZE STONE: NORMAL MM
SPEC SOURCE SUBJ: NORMAL
WT STONE: 13 MG

## 2023-12-02 ENCOUNTER — HOSPITAL ENCOUNTER (OUTPATIENT)
Dept: ULTRASOUND IMAGING | Facility: HOSPITAL | Age: 68
Discharge: HOME | End: 2023-12-02
Admitting: UROLOGY
Payer: MEDICARE

## 2023-12-02 DIAGNOSIS — N20.0 NEPHROLITHIASIS: ICD-10-CM

## 2023-12-02 PROCEDURE — 76775 US EXAM ABDO BACK WALL LIM: CPT

## 2023-12-03 PROBLEM — N20.0 NEPHROLITHIASIS: Status: ACTIVE | Noted: 2023-12-03

## 2023-12-03 NOTE — PROGRESS NOTES
Chief Complaint    Urologic complaint    Subjective          Rehan Ramirez presents to [facility]  History of Present Illness    BPH  GH  Nephrolithiasis      Stent is out    No gross hematuria or pain currently    11/23 renal ultrasound - right renal cyst 3.5 cm, negative otherwise    11/23 calcium oxalate monohydrate 70, calcium oxalate dihydrate 30    10/26/2023 right ureteroscopy - stent with string - 4 cm prostate, normal bladder, all stones removed in the right    10/26/2023 UA - no bacteria RBCs  10/26/23 CT abdomen/pelvis with - 7 mm right mid ureteral stone.  No other stones.  Images reviewed.  Bilateral renal cyst, large fat density left inguinal hernia.      Voiding ok.  on tamsulosin 0.4 mg daily for the last couple years, helping.   Nocturia x0    2 stones, 1 lithotripsy       PVR     7/22   000  3/22   048        Previous        3/22 cystoscopy-false passage couple millimeters in the meatus.  2 false passages toward the left side on the right side I did get past the flap but was a little difficult.  4 cm prostate median lobe minimal moderate trabeculations no pathology    2/22 CT uro-- large left inguinal hernia containing fat which extends down to the left scrotal sac.  Small bilateral hydroceles.  Postop changes from right inguinal hernia.  There is residual fat in the inguinal canal.  4 mm nonobstructing stone right kidney, delayed phase okay.  No other stones.  Images reviewed      General surgery is keeping an eye on him for his hernias.        patient non smoker.     No hx of renal stone.      Sister with recurrent stones and a staghorn calculus that caused non functioning kidney.      No family HX of  malignancies.        PSA    7/23   1.6        Past History:  Medical History: has a past medical history of Acid reflux, Arthritis, Diverticulitis, High cholesterol, Hypertension, Kidney stone, and Right ureteral stone (10/26/2023).   Surgical History: has a past  surgical history that includes Appendectomy; Hernia repair; Back surgery; Colonoscopy; Tonsillectomy; and ureteroscopy laser lithotripsy with stent insertion (Right, 10/26/2023).   Family History: family history includes Cancer in his mother; Diabetes in his maternal grandmother; Heart disease in his father.   Social History: reports that he has never smoked. He has never used smokeless tobacco. Alcohol use questions deferred to the physician. He reports that he does not use drugs.  Allergies: Sulfa antibiotics and Sulfamethoxazole-trimethoprim       Current Outpatient Medications:     diclofenac (VOLTAREN) 75 MG EC tablet, Take 1 tablet by mouth Every Night., Disp: , Rfl:     HYDROcodone-acetaminophen (NORCO) 5-325 MG per tablet, Take 1 tablet by mouth Every 6 (Six) Hours As Needed for Moderate Pain (Pain)., Disp: 12 tablet, Rfl: 0    lisinopril-hydrochlorothiazide (PRINZIDE,ZESTORETIC) 20-12.5 MG per tablet, Take 1 tablet by mouth Daily., Disp: , Rfl:     loratadine (CLARITIN) 10 MG tablet, Take 1 tablet by mouth Daily., Disp: , Rfl:     metoprolol tartrate (LOPRESSOR) 50 MG tablet, Take 1 tablet by mouth Every Night., Disp: , Rfl:     omeprazole (priLOSEC) 40 MG capsule, Take 1 capsule by mouth Every Morning., Disp: , Rfl:     pravastatin (PRAVACHOL) 10 MG tablet, Take 1 tablet by mouth every night at bedtime., Disp: , Rfl:     tamsulosin (FLOMAX) 0.4 MG capsule 24 hr capsule, Take 1 capsule by mouth Daily., Disp: 90 capsule, Rfl: 4            Objective     Vital Signs:   There were no vitals taken for this visit.             Assessment and Plan    Diagnoses and all orders for this visit:    1. Nephrolithiasis (Primary)          The patient was counseled on the preventative measures of kidney stones today.  This included increasing fluid intake to make at least 1.5 ml daily, decreasing meat intake, decreasing salt intake and taking in a normal amount of calcium (1000 mg daily).  Information handout given on this  today.         We also discussed the DASH diet today for stone prevention and handout was given           BPH      Cont  Flomax 0.4.       Follow-up with nurse practitioner in 1 year         PVR follow-up

## 2023-12-05 ENCOUNTER — OFFICE VISIT (OUTPATIENT)
Dept: UROLOGY | Facility: CLINIC | Age: 68
End: 2023-12-05
Payer: MEDICARE

## 2023-12-05 VITALS — HEIGHT: 73 IN | BODY MASS INDEX: 30.35 KG/M2 | WEIGHT: 229 LBS

## 2023-12-05 DIAGNOSIS — N20.0 NEPHROLITHIASIS: Primary | ICD-10-CM

## 2023-12-05 DIAGNOSIS — N40.1 BENIGN PROSTATIC HYPERPLASIA WITH LOWER URINARY TRACT SYMPTOMS, SYMPTOM DETAILS UNSPECIFIED: ICD-10-CM

## 2024-06-21 ENCOUNTER — OFFICE VISIT (OUTPATIENT)
Dept: ORTHOPEDIC SURGERY | Facility: CLINIC | Age: 69
End: 2024-06-21
Payer: MEDICARE

## 2024-06-21 VITALS
HEART RATE: 64 BPM | SYSTOLIC BLOOD PRESSURE: 146 MMHG | HEIGHT: 73 IN | OXYGEN SATURATION: 94 % | DIASTOLIC BLOOD PRESSURE: 79 MMHG | BODY MASS INDEX: 30.62 KG/M2 | WEIGHT: 231 LBS

## 2024-06-21 DIAGNOSIS — M25.561 RIGHT KNEE PAIN, UNSPECIFIED CHRONICITY: Primary | ICD-10-CM

## 2024-06-21 DIAGNOSIS — M17.11 OSTEOARTHRITIS OF RIGHT KNEE, UNSPECIFIED OSTEOARTHRITIS TYPE: ICD-10-CM

## 2024-06-21 RX ORDER — PREDNISONE 20 MG/1
1 TABLET ORAL EVERY 12 HOURS SCHEDULED
COMMUNITY
Start: 2024-04-09

## 2024-06-21 RX ORDER — TRIAMCINOLONE ACETONIDE 40 MG/ML
40 INJECTION, SUSPENSION INTRA-ARTICULAR; INTRAMUSCULAR
Status: COMPLETED | OUTPATIENT
Start: 2024-06-21 | End: 2024-06-21

## 2024-06-21 RX ORDER — LIDOCAINE HYDROCHLORIDE 10 MG/ML
5 INJECTION, SOLUTION INFILTRATION; PERINEURAL
Status: COMPLETED | OUTPATIENT
Start: 2024-06-21 | End: 2024-06-21

## 2024-06-21 RX ADMIN — TRIAMCINOLONE ACETONIDE 40 MG: 40 INJECTION, SUSPENSION INTRA-ARTICULAR; INTRAMUSCULAR at 09:59

## 2024-06-21 RX ADMIN — LIDOCAINE HYDROCHLORIDE 5 ML: 10 INJECTION, SOLUTION INFILTRATION; PERINEURAL at 09:59

## 2024-06-21 NOTE — PROGRESS NOTES
"Chief Complaint  Initial Evaluation of the Right Knee     Subjective      Rehan Ramirez presents to [facility] for initial evaluation of the right knee.  He has pain in the right knee for awhile.  He has pain on the medial aspect and lateral aspect of the knee.  He has been up and down ladders putting windows and siding on.  He had no recent injury or fall.  He takes Motrin for pain relief.  He has had Visco injections in the past that has helped.  He takes Diclofenac at times for pain.     Allergies   Allergen Reactions    Sulfa Antibiotics Hives    Sulfamethoxazole-Trimethoprim Rash        Social History     Socioeconomic History    Marital status:    Tobacco Use    Smoking status: Never    Smokeless tobacco: Never   Vaping Use    Vaping status: Never Used   Substance and Sexual Activity    Alcohol use: Defer    Drug use: Never    Sexual activity: Defer        I reviewed the patient's chief complaint, history of present illness, review of systems, past medical history, surgical history, family history, social history, medications, and allergy list.     Review of Systems     Constitutional: Denies fevers, chills, weight loss  Cardiovascular: Denies chest pain, shortness of breath  Skin: Denies rashes, acute skin changes  Neurologic: Denies headache, loss of consciousness  MSK: Right knee pain      Vital Signs:   /79   Pulse 64   Ht 185.4 cm (73\")   Wt 105 kg (231 lb)   SpO2 94%   BMI 30.48 kg/m²            Ortho Exam    Physical Exam  General:Alert. No acute distress     RIGHT KNEE Flexion 130. Extension 0. Stable to varus/valgus stress. Stable to anterior/posterior drawer. Neurovascularly intact. Negative Salvatore. Negative Lachman. Positive EHL, FHL, HS and TA. Sensation intact to light touch all 5 nerves of the foot. Ambulates with Non-antalgic gait. Patella is well tracking. Calf supple, non-tender. Positive tenderness to the medial joint line. Positive " tenderness to the lateral joint line. Positive Crepitus. Good strength to hamstrings, quadriceps, dorsiflexors, and plantar flexors.  Knee Extensor Mechanism intact     Right knee: R knee  Date/Time: 6/21/2024 9:59 AM  Consent given by: patient  Site marked: site marked  Timeout: Immediately prior to procedure a time out was called to verify the correct patient, procedure, equipment, support staff and site/side marked as required   Supporting Documentation  Indications: pain   Procedure Details  Location: knee - R knee  Needle gauge: 21 G.  Medications administered: 5 mL lidocaine 1 %; 40 mg triamcinolone acetonide 40 MG/ML  Patient tolerance: patient tolerated the procedure well with no immediate complications      This injection documentation was Scribed for Sander Barajas MD by Esme Aparicio.  06/21/24   09:59 EDT      X-Ray Report:  Right knee X-Ray  Indication: Evaluation of the right knee  AP/Lateral and Duque view(s)  Findings: Moderate advanced degenerative near bone on bone arthritis on the medial aspect of the knee.  No acute fracture or dislocation.  Enthesophyte to the patella.   Prior studies available for comparison: no      Imaging Results (Most Recent)       Procedure Component Value Units Date/Time    XR Knee 3 View Right [[id number]] Resulted: 06/21/24 0930     Updated: 06/21/24 0934             Result Review :       No results found.           Assessment and Plan     Diagnoses and all orders for this visit:    1. Right knee pain, unspecified chronicity (Primary)  -     XR Knee 3 View Right    2. Osteoarthritis of right knee, unspecified osteoarthritis type        Discussed the treatment plan with the patient. I reviewed the X-rays that were obtained today with the patient.     Discussed the risks and benefits of conservative measures.  The patient expressed understanding and wished to proceed with a right knee steroid injection.      Submit for Visco supplementation of the right knee.       Call or return if worsening symptoms.    Follow Up     After Visco supplementation of the right knee is approved.           Patient was given instructions and counseling regarding his condition or for health maintenance advice. Please see specific information pulled into the AVS if appropriate.     Scribed for Sander Barajas MD by Melani Granados MA.  06/21/24   09:33 EDT    I have personally performed the services described in this document as scribed by the above individual and it is both accurate and complete. Sander Barajas MD 06/21/24

## 2024-07-02 ENCOUNTER — TELEPHONE (OUTPATIENT)
Dept: ORTHOPEDIC SURGERY | Facility: CLINIC | Age: 69
End: 2024-07-02
Payer: MEDICARE

## 2024-07-02 NOTE — TELEPHONE ENCOUNTER
----- Message from Esme RADER sent at 6/28/2024 10:09 AM EDT -----  [place] medicare, approved bilateral knee euflexxa injections. Auth number is [id number] and is good from 07/02/24 - 12/29/24. Please call and schedule with Dr. Barajas or Serge. Thanks

## 2024-07-02 NOTE — TELEPHONE ENCOUNTER
APPT: RT KNEE ONLY EUFLEXXA INJ WITH DORYS  PATIENT REQ RT KNEE ONLY  8-9 AT 9:30 AM  8-16 AT 9:45 AM  8-23 AT 9:45 AM    LAST RT KNEE STEROID INJ:  6-21    [facility]

## 2024-07-02 NOTE — TELEPHONE ENCOUNTER
Caller: ELIANA    Relationship to patient: SELF    Best call back number: [phone]    Chief complaint: RIGHT KNEE PAIN    Type of visit: GEL INJECTIONS- INS APPROVED- HE HAS LETTER- NEEDS TO SCHEDULE. PLEASE CALL

## 2024-07-24 DIAGNOSIS — N40.0 BENIGN PROSTATIC HYPERPLASIA, UNSPECIFIED WHETHER LOWER URINARY TRACT SYMPTOMS PRESENT: ICD-10-CM

## 2024-07-24 RX ORDER — TAMSULOSIN HYDROCHLORIDE 0.4 MG/1
1 CAPSULE ORAL DAILY
Qty: 90 CAPSULE | Refills: 1 | Status: SHIPPED | OUTPATIENT
Start: 2024-07-24

## 2024-08-09 ENCOUNTER — OFFICE VISIT (OUTPATIENT)
Dept: ORTHOPEDIC SURGERY | Facility: CLINIC | Age: 69
End: 2024-08-09
Payer: MEDICARE

## 2024-08-09 VITALS
BODY MASS INDEX: 30.88 KG/M2 | SYSTOLIC BLOOD PRESSURE: 152 MMHG | DIASTOLIC BLOOD PRESSURE: 85 MMHG | HEART RATE: 66 BPM | OXYGEN SATURATION: 94 % | WEIGHT: 233 LBS | HEIGHT: 73 IN

## 2024-08-09 DIAGNOSIS — M25.561 RIGHT KNEE PAIN, UNSPECIFIED CHRONICITY: Primary | ICD-10-CM

## 2024-08-09 DIAGNOSIS — M17.11 OSTEOARTHRITIS OF RIGHT KNEE, UNSPECIFIED OSTEOARTHRITIS TYPE: ICD-10-CM

## 2024-08-09 RX ORDER — FAMOTIDINE 20 MG/1
20 TABLET, FILM COATED ORAL EVERY EVENING
COMMUNITY
Start: 2024-07-08

## 2024-08-09 RX ORDER — HYALURONATE SODIUM 10 MG/ML
20 SYRINGE (ML) INTRAARTICULAR
Status: COMPLETED | OUTPATIENT
Start: 2024-08-09 | End: 2024-08-09

## 2024-08-09 RX ADMIN — Medication 20 MG: at 10:54

## 2024-08-09 NOTE — PROGRESS NOTES
"Chief Complaint  Follow-up of the Right Knee    Subjective          History of Present Illness      Rehan Ramirez is a 68 y.o. male  presents to [facility] for     Patient presents for follow-up evaluation of right knee pain, right knee osteoarthritis.  He saw Dr. Barajas on 6/21/2024 and received a right knee steroid injection which he states actually made his knee worse he states a few days later he had pain and trouble walking.  He states gotten better over the last 5 days.  He states that the knee gives out occasionally.  He takes Motrin for pain relief.  He has had diclofenac in the past.  He is approved for Euflexxa injection #1 he is here to receive this.      Allergies   Allergen Reactions    Sulfa Antibiotics Hives    Sulfamethoxazole-Trimethoprim Rash        Social History     Socioeconomic History    Marital status:    Tobacco Use    Smoking status: Never    Smokeless tobacco: Never   Vaping Use    Vaping status: Never Used   Substance and Sexual Activity    Alcohol use: Defer    Drug use: Never    Sexual activity: Defer        REVIEW OF SYSTEMS    Constitutional: Awake alert and oriented x3, no acute distress, denies fevers, chills, weight loss  Respiratory: No respiratory distress  Vascular: Brisk cap refill, Intact distal pulses, No cyanosis, compartments soft with no signs or symptoms of compartment syndrome or DVT.   Cardiovascular: Denies chest pain, shortness of breath  Skin: Denies rashes, acute skin changes  Neurologic: Denies headache, loss of consciousness  MSK: Right knee pain      Objective   Vital Signs:   /85   Pulse 66   Ht 185.4 cm (72.99\")   Wt 106 kg (233 lb)   SpO2 94%   BMI 30.75 kg/m²     Body mass index is 30.75 kg/m².    Physical Exam       RIGHT KNEE Flexion 130. Extension 0. Stable to varus/valgus stress. Stable to anterior/posterior drawer. Neurovascularly intact. Negative Salvatore. Negative Lachman. Positive EHL, FHL, HS " and TA. Sensation intact to light touch all 5 nerves of the foot. Ambulates with Non-antalgic gait. Patella is well tracking. Calf supple, non-tender. Positive tenderness to the medial joint line. Positive tenderness to the lateral joint line. Positive Crepitus. Good strength to hamstrings, quadriceps, dorsiflexors, and plantar flexors.  Knee Extensor Mechanism intact       Large Joint: R knee  Date/Time: 8/9/2024 10:54 AM  Consent given by: patient  Site marked: site marked  Timeout: Immediately prior to procedure a time out was called to verify the correct patient, procedure, equipment, support staff and site/side marked as required   Supporting Documentation  Indications: pain   Procedure Details  Location: knee - R knee  Preparation: Patient was prepped and draped in the usual sterile fashion  Needle gauge: 21g.  Medications administered: 20 mg Sodium Hyaluronate (Viscosup) 20 MG/2ML  Patient tolerance: patient tolerated the procedure well with no immediate complications      This injection documentation was Scribed for SKY Renteria by Leslye Franco MA.  08/09/24   10:55 EDT    Imaging Results (Most Recent)       None             Result Review :   The following data was reviewed by: SKY Renteria on 08/09/2024:               Assessment and Plan    Diagnoses and all orders for this visit:    1. Right knee pain, unspecified chronicity (Primary)    2. Osteoarthritis of right knee, unspecified osteoarthritis type    Other orders  -     Large Joint: R knee        Discussed diagnosis and treatment options with the patient he elected to have right knee flex injection #1 which he tolerated well follow-up in 1 week for Euflexxa injection #2    Call or return if worsening symptoms.    Follow Up   Return in about 1 week (around 8/16/2024).  Patient was given instructions and counseling regarding his condition or for health maintenance advice. Please see specific information pulled into the  AVS if appropriate.       EMR Dragon/Transcription disclaimer:  Part of this note may be an electronic transcription/translation of spoken language to printed text using the Dragon Dictation System

## 2024-08-16 ENCOUNTER — OFFICE VISIT (OUTPATIENT)
Dept: ORTHOPEDIC SURGERY | Facility: CLINIC | Age: 69
End: 2024-08-16
Payer: MEDICARE

## 2024-08-16 VITALS
SYSTOLIC BLOOD PRESSURE: 152 MMHG | BODY MASS INDEX: 30.88 KG/M2 | HEART RATE: 64 BPM | DIASTOLIC BLOOD PRESSURE: 83 MMHG | HEIGHT: 73 IN | WEIGHT: 233 LBS | OXYGEN SATURATION: 93 %

## 2024-08-16 DIAGNOSIS — M25.561 RIGHT KNEE PAIN, UNSPECIFIED CHRONICITY: Primary | ICD-10-CM

## 2024-08-16 DIAGNOSIS — M17.11 OSTEOARTHRITIS OF RIGHT KNEE, UNSPECIFIED OSTEOARTHRITIS TYPE: ICD-10-CM

## 2024-08-16 RX ORDER — HYALURONATE SODIUM 10 MG/ML
20 SYRINGE (ML) INTRAARTICULAR
Status: COMPLETED | OUTPATIENT
Start: 2024-08-16 | End: 2024-08-16

## 2024-08-16 RX ADMIN — Medication 20 MG: at 09:59

## 2024-08-16 NOTE — PROGRESS NOTES
"Chief Complaint  Follow-up of the Right Knee    Subjective          Follow-up        Rehan Ramirez is a 68 y.o. male  presents to [facility] for     Patient presents for follow-up evaluation of right knee pain, right knee osteoarthritis and to receive Euflexxa injection #2.  He says the first injection from last week has already helped the sharp pain that was in his knee before he is happy with the progress thus far.  He states he still has click and pop in the knee but denies pain      Allergies   Allergen Reactions    Sulfa Antibiotics Hives    Sulfamethoxazole-Trimethoprim Rash        Social History     Socioeconomic History    Marital status:    Tobacco Use    Smoking status: Never    Smokeless tobacco: Never   Vaping Use    Vaping status: Never Used   Substance and Sexual Activity    Alcohol use: Defer    Drug use: Never    Sexual activity: Defer        REVIEW OF SYSTEMS    Constitutional: Awake alert and oriented x3, no acute distress, denies fevers, chills, weight loss  Respiratory: No respiratory distress  Vascular: Brisk cap refill, Intact distal pulses, No cyanosis, compartments soft with no signs or symptoms of compartment syndrome or DVT.   Cardiovascular: Denies chest pain, shortness of breath  Skin: Denies rashes, acute skin changes  Neurologic: Denies headache, loss of consciousness  MSK: Right knee pain      Objective   Vital Signs:   /83   Pulse 64   Ht 185.4 cm (72.99\")   Wt 106 kg (233 lb)   SpO2 93%   BMI 30.75 kg/m²     Body mass index is 30.75 kg/m².    Physical Exam     RIGHT KNEE Flexion 130. Extension 0. Stable to varus/valgus stress. Stable to anterior/posterior drawer. Neurovascularly intact. Negative Salvatore. Negative Lachman. Positive EHL, FHL, HS and TA. Sensation intact to light touch all 5 nerves of the foot. Ambulates with Non-antalgic gait. Patella is well tracking. Calf supple, non-tender. Positive tenderness to the medial joint " line. Positive tenderness to the lateral joint line. Positive Crepitus. Good strength to hamstrings, quadriceps, dorsiflexors, and plantar flexors.  Knee Extensor Mechanism intact            Large Joint: R knee  Date/Time: 8/16/2024 9:59 AM  Consent given by: patient  Site marked: site marked  Timeout: Immediately prior to procedure a time out was called to verify the correct patient, procedure, equipment, support staff and site/side marked as required   Supporting Documentation  Indications: pain   Procedure Details  Location: knee - R knee  Preparation: Patient was prepped and draped in the usual sterile fashion  Needle gauge: 21g.  Medications administered: 20 mg Sodium Hyaluronate (Viscosup) 20 MG/2ML  Patient tolerance: patient tolerated the procedure well with no immediate complications      This injection documentation was Scribed for SKY Renteria by Leslye Franco MA.  08/16/24   10:00 EDT    Imaging Results (Most Recent)       None             Result Review :   The following data was reviewed by: SKY Renteria on 08/16/2024:               Assessment and Plan    Diagnoses and all orders for this visit:    1. Right knee pain, unspecified chronicity (Primary)    2. Osteoarthritis of right knee, unspecified osteoarthritis type    Other orders  -     Large Joint: R knee        Discussed diagnosis and treatment options with the patient he was advised that we recommend Euflexxa injection #2 which she tolerated well follow-up in 1 week for Euflexxa injection #3    Call or return if worsening symptoms.    Follow Up   Return in about 1 week (around 8/23/2024).  Patient was given instructions and counseling regarding his condition or for health maintenance advice. Please see specific information pulled into the AVS if appropriate.       EMR Dragon/Transcription disclaimer:  Part of this note may be an electronic transcription/translation of spoken language to printed text using the  [employer] Dictation System

## 2024-08-23 ENCOUNTER — OFFICE VISIT (OUTPATIENT)
Dept: ORTHOPEDIC SURGERY | Facility: CLINIC | Age: 69
End: 2024-08-23
Payer: MEDICARE

## 2024-08-23 VITALS
BODY MASS INDEX: 30.88 KG/M2 | SYSTOLIC BLOOD PRESSURE: 176 MMHG | WEIGHT: 233 LBS | DIASTOLIC BLOOD PRESSURE: 90 MMHG | HEART RATE: 68 BPM | HEIGHT: 73 IN | OXYGEN SATURATION: 94 %

## 2024-08-23 DIAGNOSIS — M25.561 RIGHT KNEE PAIN, UNSPECIFIED CHRONICITY: Primary | ICD-10-CM

## 2024-08-23 DIAGNOSIS — M17.11 OSTEOARTHRITIS OF RIGHT KNEE, UNSPECIFIED OSTEOARTHRITIS TYPE: ICD-10-CM

## 2024-08-23 RX ORDER — HYALURONATE SODIUM 10 MG/ML
20 SYRINGE (ML) INTRAARTICULAR
Status: COMPLETED | OUTPATIENT
Start: 2024-08-23 | End: 2024-08-23

## 2024-08-23 RX ADMIN — Medication 20 MG: at 09:43

## 2024-08-23 NOTE — PROGRESS NOTES
"Chief Complaint  Follow-up of the Right Knee    Subjective          Follow-up        Rehan Ramirez is a 68 y.o. male  presents to [facility] for     Patient presents for follow-up evaluation of right knee pain and right knee osteoarthritis and to receive Euflexxa injection #3.  Patient states that he continues to have benefit with the injection he states the popping and cracking in his knees are not as bad as it was before.  He is also up on a ladder this weekend and states that irritate his knee some but he has been doing okay.      Allergies   Allergen Reactions    Sulfa Antibiotics Hives    Sulfamethoxazole-Trimethoprim Rash        Social History     Socioeconomic History    Marital status:    Tobacco Use    Smoking status: Never    Smokeless tobacco: Never   Vaping Use    Vaping status: Never Used   Substance and Sexual Activity    Alcohol use: Defer    Drug use: Never    Sexual activity: Defer        REVIEW OF SYSTEMS    Constitutional: Awake alert and oriented x3, no acute distress, denies fevers, chills, weight loss  Respiratory: No respiratory distress  Vascular: Brisk cap refill, Intact distal pulses, No cyanosis, compartments soft with no signs or symptoms of compartment syndrome or DVT.   Cardiovascular: Denies chest pain, shortness of breath  Skin: Denies rashes, acute skin changes  Neurologic: Denies headache, loss of consciousness  MSK: Right knee pain      Objective   Vital Signs:   /90   Pulse 68   Ht 185.4 cm (72.99\")   Wt 106 kg (233 lb)   SpO2 94%   BMI 30.75 kg/m²     Body mass index is 30.75 kg/m².    Physical Exam         Right knee: Flexion 130. Extension 0. Stable to varus/valgus stress. Stable to anterior/posterior drawer. Neurovascularly intact. Negative Salvatore. Negative Lachman. Positive EHL, FHL, HS and TA. Sensation intact to light touch all 5 nerves of the foot. Ambulates with Non-antalgic gait. Patella is well tracking. Calf supple, " non-tender. Positive tenderness to the medial joint line. Positive tenderness to the lateral joint line. Positive Crepitus. Good strength to hamstrings, quadriceps, dorsiflexors, and plantar flexors.  Knee Extensor Mechanism intact          Large Joint: R knee  Date/Time: 8/23/2024 9:43 AM  Consent given by: patient  Site marked: site marked  Timeout: Immediately prior to procedure a time out was called to verify the correct patient, procedure, equipment, support staff and site/side marked as required   Supporting Documentation  Indications: pain   Procedure Details  Location: knee - R knee  Preparation: Patient was prepped and draped in the usual sterile fashion  Needle gauge: 21G.  Medications administered: 20 mg Sodium Hyaluronate (Viscosup) 20 MG/2ML  Patient tolerance: patient tolerated the procedure well with no immediate complications      This injection documentation was Scribed for SKY Renteria by Leslye Franco MA.  08/23/24   09:44 EDT    Imaging Results (Most Recent)       None             Result Review :   The following data was reviewed by: SKY Renteria on 08/23/2024:               Assessment and Plan    Diagnoses and all orders for this visit:    1. Right knee pain, unspecified chronicity (Primary)  -     Large Joint: R knee    2. Osteoarthritis of right knee, unspecified osteoarthritis type  -     Large Joint: R knee        Discussed diagnosis and treatment options with the patient he elected to have right knee Euflexxa injection #3 which he tolerated well follow-up in 6 weeks for recheck    Call or return if worsening symptoms.    Follow Up   Return in about 6 weeks (around 10/4/2024) for Recheck.  Patient was given instructions and counseling regarding his condition or for health maintenance advice. Please see specific information pulled into the AVS if appropriate.       EMR Dragon/Transcription disclaimer:  Part of this note may be an electronic  transcription/translation of spoken language to printed text using the Dragon Dictation System

## 2024-11-22 ENCOUNTER — OFFICE VISIT (OUTPATIENT)
Dept: CARDIOLOGY | Facility: CLINIC | Age: 69
End: 2024-11-22
Payer: MEDICARE

## 2024-11-22 VITALS
SYSTOLIC BLOOD PRESSURE: 152 MMHG | HEART RATE: 68 BPM | BODY MASS INDEX: 31.54 KG/M2 | DIASTOLIC BLOOD PRESSURE: 77 MMHG | HEIGHT: 73 IN | WEIGHT: 238 LBS

## 2024-11-22 DIAGNOSIS — R06.09 DYSPNEA ON EXERTION: ICD-10-CM

## 2024-11-22 DIAGNOSIS — I10 HYPERTENSION, ESSENTIAL: ICD-10-CM

## 2024-11-22 DIAGNOSIS — E78.2 HYPERLIPEMIA, MIXED: ICD-10-CM

## 2024-11-22 DIAGNOSIS — R07.2 PRECORDIAL PAIN: Primary | ICD-10-CM

## 2024-11-22 PROCEDURE — 3077F SYST BP >= 140 MM HG: CPT | Performed by: INTERNAL MEDICINE

## 2024-11-22 PROCEDURE — 93000 ELECTROCARDIOGRAM COMPLETE: CPT | Performed by: INTERNAL MEDICINE

## 2024-11-22 PROCEDURE — 3078F DIAST BP <80 MM HG: CPT | Performed by: INTERNAL MEDICINE

## 2024-11-22 PROCEDURE — 1159F MED LIST DOCD IN RCRD: CPT | Performed by: INTERNAL MEDICINE

## 2024-11-22 PROCEDURE — 99204 OFFICE O/P NEW MOD 45 MIN: CPT | Performed by: INTERNAL MEDICINE

## 2024-11-22 PROCEDURE — 1160F RVW MEDS BY RX/DR IN RCRD: CPT | Performed by: INTERNAL MEDICINE

## 2024-11-22 RX ORDER — ROSUVASTATIN CALCIUM 40 MG/1
40 TABLET, COATED ORAL DAILY
Qty: 90 TABLET | Refills: 3 | Status: SHIPPED | OUTPATIENT
Start: 2024-11-22

## 2024-11-22 NOTE — PROGRESS NOTES
Chief Complaint  Chest Pain    Subjective            Rehan Ramirez presents to [facility]  Chest Pain   Associated symptoms include shortness of breath.       69-year-old white male.  He has no significant cardiac history.  He does have a family history with his father having a heart attack in his 40s.  He has been having some intermittent episodes of chest tightness with exertion such as when he walks back from his barn.  Associated with some shortness of breath.  It seems to be consistent over the last 2 to 3 months.  He has some other episodes of sharp left sided shoulder pain.  He has not had any recent cardiac workup.  His risk factors include hypertension and dyslipidemia.    PMH  Past Medical History:   Diagnosis Date    Acid reflux     Arthritis     Diverticulitis     High cholesterol     Hypertension     Kidney stone     Right ureteral stone 10/26/2023         SURGICALHX  Past Surgical History:   Procedure Laterality Date    APPENDECTOMY      BACK SURGERY      COLONOSCOPY      HERNIA REPAIR      TONSILLECTOMY      URETEROSCOPY LASER LITHOTRIPSY WITH STENT INSERTION Right 10/26/2023    Procedure: URETEROSCOPY LASER LITHOTRIPSY WITH STENT INSERTION;  Surgeon: James Del Rio MD;  Location: [facility];  Service: Urology;  Laterality: Right;        SOC  Social History     Socioeconomic History    Marital status:    Tobacco Use    Smoking status: Never    Smokeless tobacco: Never   Vaping Use    Vaping status: Never Used   Substance and Sexual Activity    Alcohol use: Defer    Drug use: Never    Sexual activity: Defer         FAMHX  Family History   Problem Relation Age of Onset    Heart disease Father     Cancer Mother     Diabetes Maternal Grandmother           ALLERGY  Allergies   Allergen Reactions    Sulfa Antibiotics Hives    Sulfamethoxazole-Trimethoprim Rash        MEDSCURRENT    Current Outpatient Medications:     diclofenac (VOLTAREN) 75 MG EC tablet, Take  "1 tablet by mouth Every Night., Disp: , Rfl:     lisinopril-hydrochlorothiazide (PRINZIDE,ZESTORETIC) 20-12.5 MG per tablet, Take 1 tablet by mouth Daily., Disp: , Rfl:     metoprolol tartrate (LOPRESSOR) 50 MG tablet, Take 1 tablet by mouth Every Night., Disp: , Rfl:     omeprazole (priLOSEC) 40 MG capsule, Take 1 capsule by mouth Every Morning., Disp: , Rfl:     tamsulosin (FLOMAX) 0.4 MG capsule 24 hr capsule, Take 1 capsule by mouth Daily., Disp: 90 capsule, Rfl: 1    famotidine (PEPCID) 20 MG tablet, Take 1 tablet by mouth Every Evening. (Patient not taking: Reported on 11/22/2024), Disp: , Rfl:     HYDROcodone-acetaminophen (NORCO) 5-325 MG per tablet, Take 1 tablet by mouth Every 6 (Six) Hours As Needed for Moderate Pain (Pain). (Patient not taking: Reported on 11/22/2024), Disp: 12 tablet, Rfl: 0    loratadine (CLARITIN) 10 MG tablet, Take 1 tablet by mouth Daily. (Patient not taking: Reported on 11/22/2024), Disp: , Rfl:     predniSONE (DELTASONE) 20 MG tablet, Take 1 tablet by mouth Every 12 (Twelve) Hours. (Patient not taking: Reported on 11/22/2024), Disp: , Rfl:     rosuvastatin (CRESTOR) 40 MG tablet, Take 1 tablet by mouth Daily., Disp: 90 tablet, Rfl: 3      Review of Systems   Constitutional: Negative.   HENT: Negative.     Eyes: Negative.    Cardiovascular:  Positive for chest pain and dyspnea on exertion.   Respiratory:  Positive for shortness of breath.    Endocrine: Negative.    Hematologic/Lymphatic: Negative.    Skin: Negative.    Musculoskeletal: Negative.    Gastrointestinal: Negative.    Genitourinary: Negative.    Neurological: Negative.    Psychiatric/Behavioral: Negative.          Objective     /77   Pulse 68   Ht 185.4 cm (72.99\")   Wt 108 kg (238 lb)   BMI 31.41 kg/m²       General Appearance:   well developed  well nourished  HENT:   oropharynx moist  lips not cyanotic  Neck:  thyroid not enlarged  supple  Respiratory:  no respiratory distress  normal breath sounds  no " rales  Cardiovascular:  no jugular venous distention  regular rhythm  apical impulse normal  S1 normal, S2 normal  no S3, no S4   no murmur  no rub, no thrill  carotid pulses normal; no bruit  pedal pulses normal  lower extremity edema: none    Musculoskeletal:  no clubbing of fingers.   normocephalic, head atraumatic  Skin:   warm, dry  Psychiatric:  judgement and insight appropriate  normal mood and affect      Result Review :     The following data was reviewed by: Wes Stokes MD on 11/22/2024:              Data reviewed : Primary care records reviewed, laboratory studies reviewed from primary care, chemistry normal,         ECG 12 Lead    Date/Time: 11/22/2024 11:20 AM  Performed by: KAVON Stokes MD    Authorized by: KAVON Stokes MD  Comparison: not compared with previous ECG   Previous ECG: no previous ECG available  Rhythm: sinus rhythm  Conduction: conduction normal  ST Segments: ST segments normal  T Waves: T waves normal  QRS axis: normal  Other: no other findings    Clinical impression: normal ECG                      Assessment and Plan        ASSESSMENT:  Encounter Diagnoses   Name Primary?    Precordial pain Yes    Dyspnea on exertion     Hypertension, essential     Hyperlipemia, mixed          PLAN:    1.  Exertional precordial discomfort described as tightness with shortness of breath.  Symptoms could be anginal.  They also could be related to hypertension, home readings are not known at this time.  Stress imaging and echo will be scheduled to evaluate for ischemic or structural abnormalities.  The patient will also measure his home blood pressure readings not only on a daily basis but when he exerts himself to see if it is markedly elevated with his walking.  2.  Essential hypertension-elevated today, the patient will need to monitor at home and with activity  3.  Mixed hyperlipidemia-not at goal.  Stop pravastatin, start Crestor 40 mg daily.    We will discuss the  diagnostic results when available          Patient was given instructions and counseling regarding his condition or for health maintenance advice. Please see specific information pulled into the AVS if appropriate.             C Trace Stokes MD  11/22/2024    11:18 EST

## 2024-12-06 ENCOUNTER — OFFICE VISIT (OUTPATIENT)
Dept: UROLOGY | Age: 69
End: 2024-12-06
Payer: MEDICARE

## 2024-12-06 ENCOUNTER — TELEPHONE (OUTPATIENT)
Dept: UROLOGY | Age: 69
End: 2024-12-06

## 2024-12-06 VITALS
WEIGHT: 234 LBS | BODY MASS INDEX: 31.01 KG/M2 | DIASTOLIC BLOOD PRESSURE: 86 MMHG | HEART RATE: 78 BPM | SYSTOLIC BLOOD PRESSURE: 141 MMHG | HEIGHT: 73 IN | OXYGEN SATURATION: 97 %

## 2024-12-06 DIAGNOSIS — R35.0 BENIGN PROSTATIC HYPERPLASIA WITH URINARY FREQUENCY: Primary | ICD-10-CM

## 2024-12-06 DIAGNOSIS — N40.1 BENIGN PROSTATIC HYPERPLASIA WITH URINARY FREQUENCY: Primary | ICD-10-CM

## 2024-12-06 DIAGNOSIS — N20.0 NEPHROLITHIASIS: ICD-10-CM

## 2024-12-06 DIAGNOSIS — N40.0 BENIGN PROSTATIC HYPERPLASIA, UNSPECIFIED WHETHER LOWER URINARY TRACT SYMPTOMS PRESENT: ICD-10-CM

## 2024-12-06 PROBLEM — N20.1 RIGHT URETERAL STONE: Status: RESOLVED | Noted: 2023-10-26 | Resolved: 2024-12-06

## 2024-12-06 LAB
BILIRUB BLD-MCNC: NEGATIVE MG/DL
CLARITY, POC: CLEAR
COLOR UR: YELLOW
GLUCOSE UR STRIP-MCNC: NEGATIVE MG/DL
KETONES UR QL: NEGATIVE
LEUKOCYTE EST, POC: NEGATIVE
NITRITE UR-MCNC: NEGATIVE MG/ML
PH UR: 5.5 [PH] (ref 5–8)
PROT UR STRIP-MCNC: ABNORMAL MG/DL
RBC # UR STRIP: NEGATIVE /UL
SP GR UR: 1.03 (ref 1–1.03)
SPECIMEN VOL SMN: 0 ML
UROBILINOGEN UR QL: ABNORMAL

## 2024-12-06 RX ORDER — PRAVASTATIN SODIUM 20 MG
1 TABLET ORAL DAILY
COMMUNITY

## 2024-12-06 RX ORDER — TAMSULOSIN HYDROCHLORIDE 0.4 MG/1
1 CAPSULE ORAL DAILY
Qty: 90 CAPSULE | Refills: 4 | Status: SHIPPED | OUTPATIENT
Start: 2024-12-06

## 2024-12-06 NOTE — PROGRESS NOTES
Chief Complaint: Urinary Frequency    Subjective         History of Present Illness  Rehan Ramirez is a 69 y.o. male presents to [facility] UROLOGY to be seen for annual f/u.    No GH or stone episodes in the last year.    He remains on tamsulosin 0.4mg daily.     Some urgency if he sits for a while.     Nocturia rare.    Stream is good.     Denies straining or hesitancy.    PCP checking PSA will get records for this.    Previous:     Stent is out     No gross hematuria or pain currently     11/23 renal ultrasound - right renal cyst 3.5 cm, negative otherwise     11/23 calcium oxalate monohydrate 70, calcium oxalate dihydrate 30     10/26/2023 right ureteroscopy - stent with string - 4 cm prostate, normal bladder, all stones removed in the right     10/26/2023 UA - no bacteria RBCs  10/26/23 CT abdomen/pelvis with - 7 mm right mid ureteral stone.  No other stones.  Images reviewed.  Bilateral renal cyst, large fat density left inguinal hernia.        Voiding ok.  on tamsulosin 0.4 mg daily for the last couple years, helping.   Nocturia x0     2 stones, 1 lithotripsy        PVR     7/22   000  3/22   048           Previous           3/22 cystoscopy-false passage couple millimeters in the meatus.  2 false passages toward the left side on the right side I did get past the flap but was a little difficult.  4 cm prostate median lobe minimal moderate trabeculations no pathology     2/22 CT uro-- large left inguinal hernia containing fat which extends down to the left scrotal sac.  Small bilateral hydroceles.  Postop changes from right inguinal hernia.  There is residual fat in the inguinal canal.  4 mm nonobstructing stone right kidney, delayed phase okay.  No other stones.  Images reviewed        General surgery is keeping an eye on him for his hernias.        patient non smoker.     No hx of renal stone.      Sister with recurrent stones and a staghorn calculus that caused non functioning kidney.  "     No family HX of  malignancies.         PSA     7/23   1.6    Objective     Past Medical History:   Diagnosis Date    Acid reflux     Arthritis     Diverticulitis     High cholesterol     Hypertension     Kidney stone     Right ureteral stone 10/26/2023       Past Surgical History:   Procedure Laterality Date    APPENDECTOMY      BACK SURGERY      COLONOSCOPY      HERNIA REPAIR      TONSILLECTOMY      URETEROSCOPY LASER LITHOTRIPSY WITH STENT INSERTION Right 10/26/2023    Procedure: URETEROSCOPY LASER LITHOTRIPSY WITH STENT INSERTION;  Surgeon: James Del Rio MD;  Location: [facility];  Service: Urology;  Laterality: Right;         Current Outpatient Medications:     diclofenac (VOLTAREN) 75 MG EC tablet, Take 1 tablet by mouth Every Night., Disp: , Rfl:     lisinopril-hydrochlorothiazide (PRINZIDE,ZESTORETIC) 20-12.5 MG per tablet, Take 1 tablet by mouth Daily., Disp: , Rfl:     metoprolol tartrate (LOPRESSOR) 50 MG tablet, Take 1 tablet by mouth Every Night., Disp: , Rfl:     omeprazole (priLOSEC) 40 MG capsule, Take 1 capsule by mouth Every Morning., Disp: , Rfl:     pravastatin (PRAVACHOL) 20 MG tablet, Take 1 tablet by mouth Daily., Disp: , Rfl:     tamsulosin (FLOMAX) 0.4 MG capsule 24 hr capsule, Take 1 capsule by mouth Daily., Disp: 90 capsule, Rfl: 4    Allergies   Allergen Reactions    Sulfa Antibiotics Hives        Family History   Problem Relation Age of Onset    Heart disease Father     Cancer Mother     Diabetes Maternal Grandmother        Social History     Socioeconomic History    Marital status:    Tobacco Use    Smoking status: Never    Smokeless tobacco: Never   Vaping Use    Vaping status: Never Used   Substance and Sexual Activity    Alcohol use: Defer    Drug use: Never    Sexual activity: Defer       Vital Signs:   /86   Pulse 78   Ht 185.4 cm (72.99\")   Wt 106 kg (234 lb)   SpO2 97%   BMI 30.88 kg/m²      Physical Exam     Result Review :   The following data " was reviewed by: LINDA Murray on 12/06/2024:  Results for orders placed or performed in visit on 12/06/24   Bladder Scan    Collection Time: 12/06/24  9:32 AM   Result Value Ref Range    Volume: 0    POC Urinalysis Dipstick    Collection Time: 12/06/24  9:33 AM    Specimen: Urine   Result Value Ref Range    Color Yellow Yellow, Straw, Dark Yellow, Rochelle    Clarity, UA Clear Clear    Glucose, UA Negative Negative mg/dL    Bilirubin Negative Negative    Ketones, UA Negative Negative    Specific Gravity  1.030 1.005 - 1.030    Blood, UA Negative Negative    pH, Urine 5.5 5.0 - 8.0    Protein, POC Trace (A) Negative mg/dL    Urobilinogen, UA 0.2 E.U./dL Normal, 0.2 E.U./dL    Leukocytes Negative Negative    Nitrite, UA Negative Negative        Bladder Scan interpretation 12/06/2024    Estimation of residual urine via BVI 3000 Verathon Bladder Scan  MA/nurse performing: cesar padilla Ma   Residual Urine: 0 ml  Indication: Benign prostatic hyperplasia with urinary frequency    Benign prostatic hyperplasia, unspecified whether lower urinary tract symptoms present    Nephrolithiasis   Position: Supine  Examination: Incremental scanning of the suprapubic area using 2.0 MHz transducer using copious amounts of acoustic gel.   Findings: An anechoic area was demonstrated which represented the bladder, with measurement of residual urine as noted. I inspected this myself. In that the residual urine was stable or insignificant, refer to plan for treatment and plan necessary at this time.          Procedures        Assessment and Plan    Diagnoses and all orders for this visit:    1. Benign prostatic hyperplasia with urinary frequency (Primary)  -     POC Urinalysis Dipstick    2. Benign prostatic hyperplasia, unspecified whether lower urinary tract symptoms present  -     tamsulosin (FLOMAX) 0.4 MG capsule 24 hr capsule; Take 1 capsule by mouth Daily.  Dispense: 90 capsule; Refill: 4    3. Nephrolithiasis    Other orders  -      Bladder Scan      Tamsulosin refilled today.    Patient's urinary symptoms are tolerable at this time and stable on tamsulosin.    He has remained with no stone symptoms.    Will plan for a follow-up appointment in 1 year.      I spent 10 minutes caring for Rehan on this date of service. This time includes time spent by me in the following activities:reviewing tests, obtaining and/or reviewing a separately obtained history, performing a medically appropriate examination and/or evaluation , counseling and educating the patient/family/caregiver, ordering medications, tests, or procedures, and documenting information in the medical record  Follow Up   Return in about 1 year (around 12/6/2025) for annual f/u with PSA pvr.  Patient was given instructions and counseling regarding his condition or for health maintenance advice. Please see specific information pulled into the AVS if appropriate.         This document has been electronically signed by LINDA Murray  December 6, 2024 10:08 EST

## 2024-12-06 NOTE — TELEPHONE ENCOUNTER
Called PCP office and LMOM give us a call back to get PSA AND RECORDS. Please have PCP office fax us those records.       HUB OKAY TO RELAY.

## 2024-12-26 ENCOUNTER — HOSPITAL ENCOUNTER (OUTPATIENT)
Dept: CARDIOLOGY | Facility: HOSPITAL | Age: 69
Discharge: HOME | End: 2024-12-26
Admitting: INTERNAL MEDICINE
Payer: MEDICARE

## 2024-12-26 ENCOUNTER — TELEPHONE (OUTPATIENT)
Dept: CARDIOLOGY | Facility: CLINIC | Age: 69
End: 2024-12-26

## 2024-12-26 ENCOUNTER — HOSPITAL ENCOUNTER (OUTPATIENT)
Dept: NUCLEAR MEDICINE | Facility: HOSPITAL | Age: 69
Discharge: HOME | End: 2024-12-26
Payer: MEDICARE

## 2024-12-26 DIAGNOSIS — R06.09 DYSPNEA ON EXERTION: ICD-10-CM

## 2024-12-26 DIAGNOSIS — R07.2 PRECORDIAL PAIN: ICD-10-CM

## 2024-12-26 DIAGNOSIS — I10 HYPERTENSION, ESSENTIAL: Primary | ICD-10-CM

## 2024-12-26 LAB
AORTIC DIMENSIONLESS INDEX: 0.84 (DI)
ASCENDING AORTA: 3.1 CM
AV MEAN PRESS GRAD SYS DOP V1V2: 4 MMHG
AV VMAX SYS DOP: 135 CM/SEC
BH CV ECHO MEAS - AO MAX PG: 7.3 MMHG
BH CV ECHO MEAS - AO ROOT DIAM: 2.9 CM
BH CV ECHO MEAS - AO V2 VTI: 31.3 CM
BH CV ECHO MEAS - AVA(I,D): 2.6 CM2
BH CV ECHO MEAS - EDV(CUBED): 54.9 ML
BH CV ECHO MEAS - EDV(MOD-SP2): 70.3 ML
BH CV ECHO MEAS - EDV(MOD-SP4): 89.3 ML
BH CV ECHO MEAS - EF(MOD-SP2): 53.3 %
BH CV ECHO MEAS - EF(MOD-SP4): 55.1 %
BH CV ECHO MEAS - ESV(CUBED): 19.7 ML
BH CV ECHO MEAS - ESV(MOD-SP2): 32.8 ML
BH CV ECHO MEAS - ESV(MOD-SP4): 40.1 ML
BH CV ECHO MEAS - FS: 28.9 %
BH CV ECHO MEAS - IVS/LVPW: 1 CM
BH CV ECHO MEAS - IVSD: 1.4 CM
BH CV ECHO MEAS - LA DIMENSION: 3.9 CM
BH CV ECHO MEAS - LAT PEAK E' VEL: 7 CM/SEC
BH CV ECHO MEAS - LV DIASTOLIC VOL/BSA (35-75): 38.8 CM2
BH CV ECHO MEAS - LV MASS(C)D: 194.1 GRAMS
BH CV ECHO MEAS - LV MAX PG: 5.8 MMHG
BH CV ECHO MEAS - LV MEAN PG: 3 MMHG
BH CV ECHO MEAS - LV SYSTOLIC VOL/BSA (12-30): 17.4 CM2
BH CV ECHO MEAS - LV V1 MAX: 120 CM/SEC
BH CV ECHO MEAS - LV V1 VTI: 26.2 CM
BH CV ECHO MEAS - LVIDD: 3.8 CM
BH CV ECHO MEAS - LVIDS: 2.7 CM
BH CV ECHO MEAS - LVOT AREA: 3.1 CM2
BH CV ECHO MEAS - LVOT DIAM: 2 CM
BH CV ECHO MEAS - LVPWD: 1.4 CM
BH CV ECHO MEAS - MED PEAK E' VEL: 7 CM/SEC
BH CV ECHO MEAS - MV A MAX VEL: 94.5 CM/SEC
BH CV ECHO MEAS - MV DEC SLOPE: 253 CM/SEC2
BH CV ECHO MEAS - MV DEC TIME: 0.28 SEC
BH CV ECHO MEAS - MV E MAX VEL: 86.5 CM/SEC
BH CV ECHO MEAS - MV E/A: 0.92
BH CV ECHO MEAS - MV MEAN PG: 2 MMHG
BH CV ECHO MEAS - MV P1/2T: 98.4 MSEC
BH CV ECHO MEAS - MV V2 VTI: 32.6 CM
BH CV ECHO MEAS - MVA(P1/2T): 2.24 CM2
BH CV ECHO MEAS - MVA(VTI): 2.5 CM2
BH CV ECHO MEAS - RAP SYSTOLE: 3 MMHG
BH CV ECHO MEAS - RVDD: 3.5 CM
BH CV ECHO MEAS - RVSP: 21.1 MMHG
BH CV ECHO MEAS - SV(LVOT): 82.3 ML
BH CV ECHO MEAS - SV(MOD-SP2): 37.5 ML
BH CV ECHO MEAS - SV(MOD-SP4): 49.2 ML
BH CV ECHO MEAS - SVI(LVOT): 35.8 ML/M2
BH CV ECHO MEAS - SVI(MOD-SP2): 16.3 ML/M2
BH CV ECHO MEAS - SVI(MOD-SP4): 21.4 ML/M2
BH CV ECHO MEAS - TAPSE (>1.6): 2.29 CM
BH CV ECHO MEAS - TR MAX PG: 18.1 MMHG
BH CV ECHO MEAS - TR MAX VEL: 213 CM/SEC
BH CV ECHO MEASUREMENTS AVERAGE E/E' RATIO: 12.36
BH CV IMMEDIATE POST TECH DATA BLOOD PRESSURE: NORMAL MMHG
BH CV IMMEDIATE POST TECH DATA HEART RATE: 145 BPM
BH CV IMMEDIATE POST TECH DATA OXYGEN SATS: 96 %
BH CV NINE MINUTE RECOVERY TECH DATA BLOOD PRESSURE: NORMAL MMHG
BH CV NINE MINUTE RECOVERY TECH DATA HEART RATE: 76 BPM
BH CV NINE MINUTE RECOVERY TECH DATA OXYGEN SATURATION: 97 %
BH CV REST NUCLEAR ISOTOPE DOSE: 10.5 MCI
BH CV SIX MINUTE RECOVERY TECH DATA BLOOD PRESSURE: NORMAL
BH CV SIX MINUTE RECOVERY TECH DATA HEART RATE: 84 BPM
BH CV SIX MINUTE RECOVERY TECH DATA OXYGEN SATURATION: 98 %
BH CV SIX MINUTE RECOVERY TECH DATA SYMPTOMS: NORMAL
BH CV STRESS BP STAGE 1: NORMAL
BH CV STRESS BP STAGE 2: NORMAL
BH CV STRESS BP STAGE 3: NORMAL
BH CV STRESS DURATION MIN STAGE 1: 3
BH CV STRESS DURATION MIN STAGE 2: 3
BH CV STRESS DURATION MIN STAGE 3: 3
BH CV STRESS DURATION SEC STAGE 1: 0
BH CV STRESS DURATION SEC STAGE 2: 0
BH CV STRESS DURATION SEC STAGE 3: 0
BH CV STRESS GRADE STAGE 1: 10
BH CV STRESS GRADE STAGE 2: 12
BH CV STRESS GRADE STAGE 3: 14
BH CV STRESS HR STAGE 1: 118
BH CV STRESS HR STAGE 2: 146
BH CV STRESS HR STAGE 3: 152
BH CV STRESS METS STAGE 1: 5
BH CV STRESS METS STAGE 2: 7.5
BH CV STRESS METS STAGE 3: 10
BH CV STRESS NUCLEAR ISOTOPE DOSE: 36.1 MCI
BH CV STRESS O2 STAGE 1: 93
BH CV STRESS O2 STAGE 2: 95
BH CV STRESS O2 STAGE 3: 95
BH CV STRESS PROTOCOL 1: NORMAL
BH CV STRESS RECOVERY BP: NORMAL MMHG
BH CV STRESS RECOVERY HR: 76 BPM
BH CV STRESS RECOVERY O2: 97 %
BH CV STRESS SPEED STAGE 1: 1.7
BH CV STRESS SPEED STAGE 2: 2.5
BH CV STRESS SPEED STAGE 3: 3.4
BH CV STRESS STAGE 1: 1
BH CV STRESS STAGE 2: 2
BH CV STRESS STAGE 3: 3
BH CV THREE MINUTE POST TECH DATA BLOOD PRESSURE: NORMAL MMHG
BH CV THREE MINUTE POST TECH DATA HEART RATE: 87 BPM
BH CV THREE MINUTE POST TECH DATA OXYGEN SATURATION: 97 %
BH CV THREE MINUTE RECOVERY TECH DATA SYMPTOM: NORMAL
IVRT: 71 MS
LEFT ATRIUM VOLUME INDEX: 24.1 ML/M2
LV EF BIPLANE MOD: 55.4 %
MAXIMAL PREDICTED HEART RATE: 152 BPM
PERCENT MAX PREDICTED HR: 100 %
SPECT HRT GATED+EF W RNC IV: 55 %
STRESS BASELINE BP: NORMAL MMHG
STRESS BASELINE HR: 66 BPM
STRESS O2 SAT REST: 97 %
STRESS PERCENT HR: 118 %
STRESS POST ESTIMATED WORKLOAD: 7.1 METS
STRESS POST EXERCISE DUR MIN: 9 MIN
STRESS POST EXERCISE DUR SEC: 4 SEC
STRESS POST O2 SAT PEAK: 98 %
STRESS POST PEAK BP: NORMAL MMHG
STRESS POST PEAK HR: 152 BPM
STRESS TARGET HR: 129 BPM

## 2024-12-26 PROCEDURE — 93017 CV STRESS TEST TRACING ONLY: CPT

## 2024-12-26 PROCEDURE — 93306 TTE W/DOPPLER COMPLETE: CPT

## 2024-12-26 PROCEDURE — A9502 TC99M TETROFOSMIN: HCPCS | Performed by: INTERNAL MEDICINE

## 2024-12-26 PROCEDURE — 78452 HT MUSCLE IMAGE SPECT MULT: CPT

## 2024-12-26 PROCEDURE — 34310000005 TECHNETIUM TETROFOSMIN KIT: Performed by: INTERNAL MEDICINE

## 2024-12-26 RX ORDER — LISINOPRIL AND HYDROCHLOROTHIAZIDE 12.5; 2 MG/1; MG/1
2 TABLET ORAL DAILY
Qty: 180 TABLET | Refills: 3 | Status: SHIPPED | OUTPATIENT
Start: 2024-12-26

## 2024-12-26 RX ADMIN — TETROFOSMIN 1 DOSE: 1.38 INJECTION, POWDER, LYOPHILIZED, FOR SOLUTION INTRAVENOUS at 08:05

## 2024-12-26 RX ADMIN — TETROFOSMIN 1 DOSE: 1.38 INJECTION, POWDER, LYOPHILIZED, FOR SOLUTION INTRAVENOUS at 09:27

## 2024-12-26 NOTE — TELEPHONE ENCOUNTER
Per Dr. Stokes:    SPECT showed normal cardiac blood flow, no evidence of blocked arteries on the study     The patient had good functional capacity on the treadmill, however, his blood pressure was markedly elevated during the exercise which is what I had discussed with him in the office as possibly the reason for his shortness of breath.  This data would confirm that.     I am doubling his lisinopril/HCTZ and have ordered that to the pharmacy.  We will need to get a basic metabolic panel in 2 weeks     Have him do a blood pressure check in the office with Elena or LINDA in 3 to 4 weeks         SW patient regarding results and recommendations. Voiced understanding. Patient reports shortness of breath with exertion and with elevated blood pressure. Denies any chest pain. Patient has been compliant with metoprolol tartrate 50 mg nightly and lisinopril 20-12.5 mg daily.    BP readings:    12/26: 177/104 169/98  12/25:  159/94  172/95   12/24:    146/89  12/23: 154/89  145/89  12/22:    167/105  12/21: 147/85  12/20: 152/84  147/84  12/19: 140/86    Appointment made. Advised patient to monitor blood pressure twice a day and bring to appointment.

## 2024-12-26 NOTE — TELEPHONE ENCOUNTER
----- Message from KAVON Stokes sent at 12/26/2024  9:52 AM EST -----  Echo reviewed  Heart function was normal, there is mild increased thickness of the heart muscle with mildly increased stiffness of the heart muscle indicating the effects of high blood pressure.  Valve function was normal    The patient is supposed to be measuring his blood pressures at home and when he is short of breath, does he have any data to share?    Stress test interpretation forthcoming

## 2025-01-14 ENCOUNTER — OFFICE VISIT (OUTPATIENT)
Dept: CARDIOLOGY | Facility: CLINIC | Age: 70
End: 2025-01-14
Payer: MEDICARE

## 2025-01-14 VITALS
DIASTOLIC BLOOD PRESSURE: 82 MMHG | SYSTOLIC BLOOD PRESSURE: 136 MMHG | HEIGHT: 73 IN | WEIGHT: 237 LBS | BODY MASS INDEX: 31.41 KG/M2 | HEART RATE: 66 BPM

## 2025-01-14 DIAGNOSIS — R07.2 PRECORDIAL PAIN: ICD-10-CM

## 2025-01-14 DIAGNOSIS — I10 HYPERTENSION, ESSENTIAL: Primary | ICD-10-CM

## 2025-01-14 DIAGNOSIS — R06.09 DYSPNEA ON EXERTION: ICD-10-CM

## 2025-01-14 DIAGNOSIS — E78.2 HYPERLIPEMIA, MIXED: ICD-10-CM

## 2025-01-14 NOTE — PROGRESS NOTES
Chief Complaint  Follow-up    Subjective            Rehan Ramirez presents to [facility] CARDIOLOGY  History of Present Illness    Rehan is here for follow-up evaluation management of shortness of breath on exertion, essential hypertension, mixed hyperlipidemia.  He recently underwent stress imaging which showed no evidence of ischemia however his blood pressure was markedly elevated during exercise.  His home blood pressure log shows this as well.  Since adjusting his antihypertensive therapy, his blood pressures have improved.  He also had an echo that showed normal LV systolic function ejection fraction 55% with mild LVH and grade 1 diastolic dysfunction.  Normal valve function.  He is currently being treated for bronchitis/respiratory infection.  His shortness of breath has not progressed.  He has no chest pain.    PMH  Past Medical History:   Diagnosis Date    Acid reflux     Arthritis     Diverticulitis     High cholesterol     Hypertension     Kidney stone     Right ureteral stone 10/26/2023         SURGICALHX  Past Surgical History:   Procedure Laterality Date    APPENDECTOMY      BACK SURGERY      COLONOSCOPY      HERNIA REPAIR      TONSILLECTOMY      URETEROSCOPY LASER LITHOTRIPSY WITH STENT INSERTION Right 10/26/2023    Procedure: URETEROSCOPY LASER LITHOTRIPSY WITH STENT INSERTION;  Surgeon: James Del Rio MD;  Location: [facility] OR;  Service: Urology;  Laterality: Right;        SOC  Social History     Socioeconomic History    Marital status:    Tobacco Use    Smoking status: Never    Smokeless tobacco: Never   Vaping Use    Vaping status: Never Used   Substance and Sexual Activity    Alcohol use: Defer    Drug use: Never    Sexual activity: Defer         FAMHX  Family History   Problem Relation Age of Onset    Heart disease Father     Cancer Mother     Diabetes Maternal Grandmother           ALLERGY  Allergies   Allergen Reactions    Sulfa Antibiotics Hives     "    MEDSCURRENT    Current Outpatient Medications:     diclofenac (VOLTAREN) 75 MG EC tablet, Take 1 tablet by mouth Every Night., Disp: , Rfl:     lisinopril-hydrochlorothiazide (PRINZIDE,ZESTORETIC) 20-12.5 MG per tablet, Take 2 tablets by mouth Daily., Disp: 180 tablet, Rfl: 3    metoprolol tartrate (LOPRESSOR) 50 MG tablet, Take 1 tablet by mouth Every Night., Disp: , Rfl:     omeprazole (priLOSEC) 40 MG capsule, Take 1 capsule by mouth Every Morning., Disp: , Rfl:     pravastatin (PRAVACHOL) 20 MG tablet, Take 1 tablet by mouth Daily., Disp: , Rfl:     tamsulosin (FLOMAX) 0.4 MG capsule 24 hr capsule, Take 1 capsule by mouth Daily., Disp: 90 capsule, Rfl: 4      Review of Systems   Cardiovascular:  Negative for chest pain and palpitations.   Respiratory:  Positive for cough and shortness of breath.         Objective     /82   Pulse 66   Ht 185.4 cm (72.99\")   Wt 108 kg (237 lb)   BMI 31.28 kg/m²       General Appearance:   well developed  well nourished  HENT:   oropharynx moist  lips not cyanotic  Neck:  thyroid not enlarged  supple  Respiratory:  no respiratory distress  normal breath sounds  no rales  Cardiovascular:  no jugular venous distention  regular rhythm  apical impulse normal  S1 normal, S2 normal  no S3, no S4   no murmur  no rub, no thrill  carotid pulses normal; no bruit  pedal pulses normal  lower extremity edema: none    Musculoskeletal:  no clubbing of fingers.   normocephalic, head atraumatic  Skin:   warm, dry  Psychiatric:  judgement and insight appropriate  normal mood and affect      Result Review :     The following data was reviewed by: LINDA Bishop on 01/14/2025:              Data reviewed : Cardiology studies testing reviewed above      Procedures      Rehan WILLIAMSON Ashley  reports that he has never smoked. He has never used smokeless tobacco. I have educated him on the risk of diseases from using tobacco products such as cancer, COPD, and heart disease.        "            Assessment and Plan        ASSESSMENT:  Encounter Diagnoses   Name Primary?    Hypertension, essential Yes    Precordial pain     Dyspnea on exertion     Hyperlipemia, mixed          PLAN:    1.  Dyspnea on exertion/precordial chest pain, symptoms appear to have been related to uncontrolled blood pressure.  His blood pressure medication has been adjusted and symptoms are stable.  He has no chest pain.  He is currently getting over a respiratory infection but otherwise is feeling well.  Stress test and echo did not show any evidence of ischemia, mild LVH and grade 1 diastolic dysfunction on echo.  2.  Essential hypertension, stable.  Continue current medical therapy.  Will request labs from PCP.  3.  Mixed hyperlipidemia, recently switched from pravastatin to Crestor, continue.    Follow-up annually unless problems arise.      Patient was given instructions and counseling regarding his condition or for health maintenance advice. Please see specific information pulled into the AVS if appropriate.           Chioma Hernandez, APRN   1/14/2025  09:24 EST

## 2025-05-02 ENCOUNTER — HOSPITAL ENCOUNTER (EMERGENCY)
Facility: HOSPITAL | Age: 70
Discharge: HOME | End: 2025-05-02
Attending: EMERGENCY MEDICINE
Payer: MEDICARE

## 2025-05-02 ENCOUNTER — APPOINTMENT (OUTPATIENT)
Dept: CT IMAGING | Facility: HOSPITAL | Age: 70
End: 2025-05-02
Payer: MEDICARE

## 2025-05-02 ENCOUNTER — APPOINTMENT (OUTPATIENT)
Dept: GENERAL RADIOLOGY | Facility: HOSPITAL | Age: 70
End: 2025-05-02
Payer: MEDICARE

## 2025-05-02 VITALS
SYSTOLIC BLOOD PRESSURE: 146 MMHG | HEIGHT: 73 IN | OXYGEN SATURATION: 92 % | RESPIRATION RATE: 16 BRPM | WEIGHT: 243.83 LBS | TEMPERATURE: 98.4 F | DIASTOLIC BLOOD PRESSURE: 80 MMHG | HEART RATE: 72 BPM | BODY MASS INDEX: 32.32 KG/M2

## 2025-05-02 DIAGNOSIS — K40.90 LEFT INGUINAL HERNIA: ICD-10-CM

## 2025-05-02 DIAGNOSIS — S39.011A STRAIN OF ABDOMINAL MUSCLE, INITIAL ENCOUNTER: ICD-10-CM

## 2025-05-02 DIAGNOSIS — K57.90 DIVERTICULOSIS: Primary | ICD-10-CM

## 2025-05-02 LAB
ALBUMIN SERPL-MCNC: 4.2 G/DL (ref 3.5–5.2)
ALBUMIN/GLOB SERPL: 1.4 G/DL
ALP SERPL-CCNC: 77 U/L (ref 39–117)
ALT SERPL W P-5'-P-CCNC: 24 U/L (ref 1–41)
ANION GAP SERPL CALCULATED.3IONS-SCNC: 12.2 MMOL/L (ref 5–15)
AST SERPL-CCNC: 23 U/L (ref 1–40)
BACTERIA UR QL AUTO: ABNORMAL /HPF
BASOPHILS # BLD AUTO: 0.05 10*3/MM3 (ref 0–0.2)
BASOPHILS NFR BLD AUTO: 0.5 % (ref 0–1.5)
BILIRUB SERPL-MCNC: 0.3 MG/DL (ref 0–1.2)
BILIRUB UR QL STRIP: NEGATIVE
BUN SERPL-MCNC: 24 MG/DL (ref 8–23)
BUN/CREAT SERPL: 20.7 (ref 7–25)
CALCIUM SPEC-SCNC: 9.5 MG/DL (ref 8.6–10.5)
CHLORIDE SERPL-SCNC: 102 MMOL/L (ref 98–107)
CLARITY UR: CLEAR
CO2 SERPL-SCNC: 27.8 MMOL/L (ref 22–29)
COLOR UR: YELLOW
CREAT SERPL-MCNC: 1.16 MG/DL (ref 0.76–1.27)
D-LACTATE SERPL-SCNC: 1.2 MMOL/L (ref 0.5–2)
DEPRECATED RDW RBC AUTO: 45.7 FL (ref 37–54)
EGFRCR SERPLBLD CKD-EPI 2021: 68.6 ML/MIN/1.73
EOSINOPHIL # BLD AUTO: 0.15 10*3/MM3 (ref 0–0.4)
EOSINOPHIL NFR BLD AUTO: 1.6 % (ref 0.3–6.2)
ERYTHROCYTE [DISTWIDTH] IN BLOOD BY AUTOMATED COUNT: 13.4 % (ref 12.3–15.4)
GLOBULIN UR ELPH-MCNC: 3.1 GM/DL
GLUCOSE SERPL-MCNC: 101 MG/DL (ref 65–99)
GLUCOSE UR STRIP-MCNC: NEGATIVE MG/DL
HCT VFR BLD AUTO: 42 % (ref 37.5–51)
HGB BLD-MCNC: 13.4 G/DL (ref 13–17.7)
HGB UR QL STRIP.AUTO: NEGATIVE
HOLD SPECIMEN: NORMAL
HOLD SPECIMEN: NORMAL
HYALINE CASTS UR QL AUTO: ABNORMAL /LPF
IMM GRANULOCYTES # BLD AUTO: 0.02 10*3/MM3 (ref 0–0.05)
IMM GRANULOCYTES NFR BLD AUTO: 0.2 % (ref 0–0.5)
KETONES UR QL STRIP: NEGATIVE
LEUKOCYTE ESTERASE UR QL STRIP.AUTO: ABNORMAL
LIPASE SERPL-CCNC: 21 U/L (ref 13–60)
LYMPHOCYTES # BLD AUTO: 1.79 10*3/MM3 (ref 0.7–3.1)
LYMPHOCYTES NFR BLD AUTO: 19.2 % (ref 19.6–45.3)
MCH RBC QN AUTO: 29.9 PG (ref 26.6–33)
MCHC RBC AUTO-ENTMCNC: 31.9 G/DL (ref 31.5–35.7)
MCV RBC AUTO: 93.8 FL (ref 79–97)
MONOCYTES # BLD AUTO: 0.78 10*3/MM3 (ref 0.1–0.9)
MONOCYTES NFR BLD AUTO: 8.4 % (ref 5–12)
NEUTROPHILS NFR BLD AUTO: 6.52 10*3/MM3 (ref 1.7–7)
NEUTROPHILS NFR BLD AUTO: 70.1 % (ref 42.7–76)
NITRITE UR QL STRIP: NEGATIVE
NRBC BLD AUTO-RTO: 0 /100 WBC (ref 0–0.2)
PH UR STRIP.AUTO: 6 [PH] (ref 5–8)
PLATELET # BLD AUTO: 228 10*3/MM3 (ref 140–450)
PMV BLD AUTO: 9.5 FL (ref 6–12)
POTASSIUM SERPL-SCNC: 3.2 MMOL/L (ref 3.5–5.2)
PROT SERPL-MCNC: 7.3 G/DL (ref 6–8.5)
PROT UR QL STRIP: NEGATIVE
RBC # BLD AUTO: 4.48 10*6/MM3 (ref 4.14–5.8)
RBC # UR STRIP: ABNORMAL /HPF
REF LAB TEST METHOD: ABNORMAL
SODIUM SERPL-SCNC: 142 MMOL/L (ref 136–145)
SP GR UR STRIP: 1.02 (ref 1–1.03)
SQUAMOUS #/AREA URNS HPF: ABNORMAL /HPF
UROBILINOGEN UR QL STRIP: ABNORMAL
WBC # UR STRIP: ABNORMAL /HPF
WBC NRBC COR # BLD AUTO: 9.31 10*3/MM3 (ref 3.4–10.8)
WHOLE BLOOD HOLD COAG: NORMAL
WHOLE BLOOD HOLD SPECIMEN: NORMAL

## 2025-05-02 PROCEDURE — 85025 COMPLETE CBC W/AUTO DIFF WBC: CPT | Performed by: EMERGENCY MEDICINE

## 2025-05-02 PROCEDURE — 25510000001 IOPAMIDOL PER 1 ML: Performed by: EMERGENCY MEDICINE

## 2025-05-02 PROCEDURE — 80053 COMPREHEN METABOLIC PANEL: CPT | Performed by: EMERGENCY MEDICINE

## 2025-05-02 PROCEDURE — 99285 EMERGENCY DEPT VISIT HI MDM: CPT

## 2025-05-02 PROCEDURE — 83690 ASSAY OF LIPASE: CPT | Performed by: EMERGENCY MEDICINE

## 2025-05-02 PROCEDURE — 71045 X-RAY EXAM CHEST 1 VIEW: CPT

## 2025-05-02 PROCEDURE — 36415 COLL VENOUS BLD VENIPUNCTURE: CPT

## 2025-05-02 PROCEDURE — 81001 URINALYSIS AUTO W/SCOPE: CPT | Performed by: EMERGENCY MEDICINE

## 2025-05-02 PROCEDURE — 83605 ASSAY OF LACTIC ACID: CPT | Performed by: EMERGENCY MEDICINE

## 2025-05-02 PROCEDURE — 74177 CT ABD & PELVIS W/CONTRAST: CPT

## 2025-05-02 RX ORDER — DEXTROMETHORPHAN HYDROBROMIDE AND PROMETHAZINE HYDROCHLORIDE 15; 6.25 MG/5ML; MG/5ML
SYRUP ORAL
COMMUNITY
Start: 2025-01-13

## 2025-05-02 RX ORDER — DEXAMETHASONE 6 MG/1
TABLET ORAL EVERY 24 HOURS
COMMUNITY
Start: 2025-01-13

## 2025-05-02 RX ORDER — LISINOPRIL AND HYDROCHLOROTHIAZIDE 12.5; 2 MG/1; MG/1
TABLET ORAL EVERY 24 HOURS
COMMUNITY

## 2025-05-02 RX ORDER — ROSUVASTATIN CALCIUM 40 MG/1
40 TABLET, COATED ORAL DAILY
COMMUNITY

## 2025-05-02 RX ORDER — IOPAMIDOL 755 MG/ML
100 INJECTION, SOLUTION INTRAVASCULAR
Status: COMPLETED | OUTPATIENT
Start: 2025-05-02 | End: 2025-05-02

## 2025-05-02 RX ORDER — SODIUM CHLORIDE 0.9 % (FLUSH) 0.9 %
10 SYRINGE (ML) INJECTION AS NEEDED
Status: DISCONTINUED | OUTPATIENT
Start: 2025-05-02 | End: 2025-05-03 | Stop reason: HOSPADM

## 2025-05-02 RX ADMIN — TOPICAL ANESTHETIC 1 SPRAY: 200 SPRAY DENTAL; PERIODONTAL at 21:53

## 2025-05-02 RX ADMIN — IOPAMIDOL 100 ML: 755 INJECTION, SOLUTION INTRAVENOUS at 22:25

## 2025-05-03 NOTE — ED PROVIDER NOTES
Time: 8:58 PM EDT  Date of encounter:  5/2/2025  Independent Historian/Clinical History and Information was obtained by:   Patient    History is limited by: N/A    Chief Complaint: Flank pain      History of Present Illness:  Patient is a 69 y.o. year old male who presents to the emergency department for evaluation of left-sided flank pain, cough, nausea/vomiting.  Patient states the left flank pain started today around 7 PM.  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  Patient states the pain comes in waves. Patient states he has a history of kidney stones and diverticulitis.  He did not treat any of his symptoms prior to arrival.  He rates his pain at 3 out of 10, but when the pain hits its a 10 out of 10.  Patient states he ate a burger at approximately 5:30 PM this evening, then began vomiting at approximately 6:30 PM.  Patient also admits to choking on a peanut butter jelly sandwich last night, and began coughing causing him to be somewhat hoarse today.  He states it feels like it is caught in his chest.  He denies being on blood thinners. He currently has a headache but denies shortness of breath, chest pain, urinary changes, abdominal pain, new medications, fever/chills.      Patient Care Team  Primary Care Provider: Hugh Deleon MD    Past Medical History:     Allergies   Allergen Reactions    Sulfa Antibiotics Hives     Past Medical History:   Diagnosis Date    Acid reflux     Arthritis     Diverticulitis     High cholesterol     Hypertension     Kidney stone     Right ureteral stone 10/26/2023     Past Surgical History:   Procedure Laterality Date    APPENDECTOMY      BACK SURGERY      COLONOSCOPY      HERNIA REPAIR      TONSILLECTOMY      URETEROSCOPY LASER LITHOTRIPSY WITH STENT INSERTION Right 10/26/2023    Procedure: URETEROSCOPY LASER LITHOTRIPSY WITH STENT INSERTION;  Surgeon: James Del Rio MD;  Location: [facility] MAIN OR;  Service: Urology;  Laterality: Right;      Family History   Problem Relation Age of Onset    Heart disease Father     Cancer Mother     Diabetes Maternal Grandmother        Home Medications:  Prior to Admission medications    Medication Sig Start Date End Date Taking? Authorizing Provider   amoxicillin-clavulanate (AUGMENTIN) 875-125 MG per tablet Every 12 (Twelve) Hours. 1/13/25  Yes Ginette Loco MD   dexAMETHasone (DECADRON) 6 MG tablet Daily. 1/13/25  Yes Ginette Loco MD   diclofenac (VOLTAREN) 50 MG EC tablet Take 1 tablet by mouth Every 12 (Twelve) Hours. 4/16/25  Yes Ginette Loco MD   promethazine-dextromethorphan (PROMETHAZINE-DM) 6.25-15 MG/5ML syrup 5-10 mL orally At Bedtime for 14 days 1/13/25  Yes Ginette Loco MD   diclofenac (VOLTAREN) 75 MG EC tablet Take 1 tablet by mouth Every Night. 1/27/22   Ginette Loco MD   lisinopril-hydrochlorothiazide (PRINZIDE,ZESTORETIC) 20-12.5 MG per tablet Take 2 tablets by mouth Daily. 12/26/24   KAVON Stokes MD   lisinopril-hydrochlorothiazide (PRINZIDE,ZESTORETIC) 20-12.5 MG per tablet Daily.    Ginette Loco MD   metoprolol tartrate (LOPRESSOR) 50 MG tablet Take 1 tablet by mouth Every Night. 1/27/22   Ginette Loco MD   omeprazole (priLOSEC) 40 MG capsule Take 1 capsule by mouth Every Morning. 1/27/22   Ginette Loco MD   pravastatin (PRAVACHOL) 20 MG tablet Take 1 tablet by mouth Daily.    Ginette Loco MD   rosuvastatin (CRESTOR) 40 MG tablet Take 1 tablet by mouth Daily.    Ginette Loco MD   tamsulosin (FLOMAX) 0.4 MG capsule 24 hr capsule Take 1 capsule by mouth Daily. 12/6/24   Rosibel Cho, APRN        Social History:   Social History     Tobacco Use    Smoking status: Never    Smokeless tobacco: Never   Vaping Use    Vaping status: Never Used   Substance Use Topics    Alcohol use: Defer    Drug use: Never         Review of Systems:  Review of Systems   Constitutional:  Negative for chills and fever.   Eyes:  "Negative.    Respiratory:  Negative for cough, shortness of breath, wheezing and stridor.    Cardiovascular:  Negative for chest pain.   Gastrointestinal:  Positive for nausea and vomiting. Negative for abdominal pain.   Genitourinary: Negative.  Positive for flank pain. Negative for difficulty urinating, dysuria and hematuria.   Musculoskeletal:  Positive for back pain (L flank).   Skin: Negative.    Neurological:  Positive for headaches.   Psychiatric/Behavioral: Negative.          Physical Exam:  /80 (BP Location: Right arm, Patient Position: Lying)   Pulse 72   Temp 98.4 °F (36.9 °C) (Oral)   Resp 16   Ht 185.4 cm (73\")   Wt 111 kg (243 lb 13.3 oz)   SpO2 92%   BMI 32.17 kg/m²     Physical Exam  Vitals and nursing note reviewed.   Constitutional:       General: He is not in acute distress.     Appearance: Normal appearance. He is not ill-appearing, toxic-appearing or diaphoretic.   HENT:      Head: Normocephalic and atraumatic.      Nose: Nose normal.      Mouth/Throat:      Mouth: Mucous membranes are moist.      Pharynx: Oropharynx is clear.   Eyes:      Extraocular Movements: Extraocular movements intact.      Conjunctiva/sclera: Conjunctivae normal.      Pupils: Pupils are equal, round, and reactive to light.   Cardiovascular:      Rate and Rhythm: Normal rate and regular rhythm.      Heart sounds: Normal heart sounds. No murmur heard.     No friction rub. No gallop.   Pulmonary:      Effort: Pulmonary effort is normal. No respiratory distress.      Breath sounds: Normal breath sounds. No stridor. No wheezing, rhonchi or rales.   Abdominal:      General: Abdomen is flat. Bowel sounds are normal. There is no distension.      Palpations: Abdomen is soft.      Tenderness: There is abdominal tenderness (RUQ) in the left upper quadrant. There is no right CVA tenderness, left CVA tenderness or guarding.       Musculoskeletal:         General: Normal range of motion.      Cervical back: Normal range of " motion and neck supple.   Skin:     General: Skin is warm and dry.   Neurological:      General: No focal deficit present.      Mental Status: He is alert and oriented to person, place, and time.   Psychiatric:         Attention and Perception: Attention and perception normal.         Mood and Affect: Mood and affect normal.         Speech: Speech normal.         Behavior: Behavior normal. Behavior is cooperative.         Thought Content: Thought content normal.                    Medical Decision Making:      Comorbidities that affect care:    Hypertension, Obesity, kidney stones, diverticulitis    External Notes reviewed:  Office visit cardiology 1/14/2025      The following orders were placed and all results were independently analyzed by me:  Orders Placed This Encounter   Procedures    XR Chest 1 View    CT Abdomen Pelvis With Contrast    [place] Draw    Comprehensive Metabolic Panel    Lipase    Urinalysis With Microscopic If Indicated (No Culture) - Urine, Clean Catch    Lactic Acid, Plasma    CBC Auto Differential    Urinalysis, Microscopic Only - Urine, Clean Catch    Ambulatory Referral to General Surgery    NPO Diet NPO Type: Strict NPO    Undress & Gown    Insert Peripheral IV    CBC & Differential    Green Top (Gel)    Lavender Top    Gold Top - SST    Light Blue Top       Medications Given in the Emergency Department:  Medications   sodium chloride 0.9 % flush 10 mL (has no administration in time range)   benzocaine (HURRICAINE) 20 % liquid solution 1 spray (1 spray Mouth/Throat Given 5/2/25 2153)   iopamidol (ISOVUE-370) 76 % injection 100 mL (100 mL Intravenous Given 5/2/25 2225)        ED Course:    ED Course as of 05/02/25 2245   Fri May 02, 2025   2237 CT Abdomen Pelvis With Contrast [AJ]      ED Course User Index  [AJ] [place], TAJ       Labs:    Lab Results (last 24 hours)       Procedure Component Value Units Date/Time    CBC & Differential [[id number]]  (Abnormal) Collected:  05/02/25 2027    Specimen: Blood Updated: 05/02/25 2039    Narrative:      The following orders were created for panel order CBC & Differential.  Procedure                               Abnormality         Status                     ---------                               -----------         ------                     CBC Auto Differential[[id number]]        Abnormal            Final result                 Please view results for these tests on the individual orders.    Comprehensive Metabolic Panel [[id number]]  (Abnormal) Collected: 05/02/25 2027    Specimen: Blood Updated: 05/02/25 2134     Glucose 101 mg/dL      BUN 24 mg/dL      Creatinine 1.16 mg/dL      Sodium 142 mmol/L      Potassium 3.2 mmol/L      Chloride 102 mmol/L      CO2 27.8 mmol/L      Calcium 9.5 mg/dL      Total Protein 7.3 g/dL      Albumin 4.2 g/dL      ALT (SGPT) 24 U/L      AST (SGOT) 23 U/L      Alkaline Phosphatase 77 U/L      Total Bilirubin 0.3 mg/dL      Globulin 3.1 gm/dL      A/G Ratio 1.4 g/dL      BUN/Creatinine Ratio 20.7     Anion Gap 12.2 mmol/L      eGFR 68.6 mL/min/1.73     Narrative:      GFR Categories in Chronic Kidney Disease (CKD)              GFR Category          GFR (mL/min/1.73)    Interpretation  G1                    90 or greater        Normal or high (1)  G2                    60-89                Mild decrease (1)  G3a                   45-59                Mild to moderate decrease  G3b                   30-44                Moderate to severe decrease  G4                    15-29                Severe decrease  G5                    14 or less           Kidney failure    (1)In the absence of evidence of kidney disease, neither GFR category G1 or G2 fulfill the criteria for CKD.    eGFR calculation 2021 CKD-EPI creatinine equation, which does not include race as a factor    Lipase [[id number]]  (Normal) Collected: 05/02/25 2027    Specimen: Blood Updated: 05/02/25 2053     Lipase 21 U/L     CBC Auto Differential  [[id number]]  (Abnormal) Collected: 05/02/25 2027    Specimen: Blood Updated: 05/02/25 2039     WBC 9.31 10*3/mm3      RBC 4.48 10*6/mm3      Hemoglobin 13.4 g/dL      Hematocrit 42.0 %      MCV 93.8 fL      MCH 29.9 pg      MCHC 31.9 g/dL      RDW 13.4 %      RDW-SD 45.7 fl      MPV 9.5 fL      Platelets 228 10*3/mm3      Neutrophil % 70.1 %      Lymphocyte % 19.2 %      Monocyte % 8.4 %      Eosinophil % 1.6 %      Basophil % 0.5 %      Immature Grans % 0.2 %      Neutrophils, Absolute 6.52 10*3/mm3      Lymphocytes, Absolute 1.79 10*3/mm3      Monocytes, Absolute 0.78 10*3/mm3      Eosinophils, Absolute 0.15 10*3/mm3      Basophils, Absolute 0.05 10*3/mm3      Immature Grans, Absolute 0.02 10*3/mm3      nRBC 0.0 /100 WBC     Urinalysis With Microscopic If Indicated (No Culture) - Urine, Clean Catch [[id number]]  (Abnormal) Collected: 05/02/25 2031    Specimen: Urine, Clean Catch Updated: 05/02/25 2051     Color, UA Yellow     Appearance, UA Clear     pH, UA 6.0     Specific Gravity, UA 1.019     Glucose, UA Negative     Ketones, UA Negative     Bilirubin, UA Negative     Blood, UA Negative     Protein, UA Negative     Leuk Esterase, UA Small (1+)     Nitrite, UA Negative     Urobilinogen, UA 1.0 E.U./dL    Urinalysis, Microscopic Only - Urine, Clean Catch [[id number]]  (Abnormal) Collected: 05/02/25 2031    Specimen: Urine, Clean Catch Updated: 05/02/25 2051     RBC, UA 0-2 /HPF      WBC, UA 11-20 /HPF      Bacteria, UA None Seen /HPF      Squamous Epithelial Cells, UA 0-2 /HPF      Hyaline Casts, UA None Seen /LPF      Methodology Automated Microscopy    Lactic Acid, Plasma [[id number]]  (Normal) Collected: 05/02/25 2040    Specimen: Blood Updated: 05/02/25 2131     Lactate 1.2 mmol/L              Imaging:    CT Abdomen Pelvis With Contrast  Result Date: 5/2/2025  CT ABDOMEN PELVIS W CONTRAST Date of Exam: 5/2/2025 10:20 PM EDT Indication: llq/l flank pain. Comparison: 10/26/2023 Technique: Axial CT images were  obtained of the abdomen and pelvis after the uneventful intravenous administration of iodinated contrast. Reconstructed coronal and sagittal images were also obtained. Automated exposure control and iterative construction methods were used. Findings: Visualized Chest:  The visualized lung bases and lower mediastinal structures are unremarkable. Liver: Liver is normal in size and CT density. No focal lesions. Gallbladder: The gallbladder is normal without evidence of radiopaque stones. Bile Ducts: No billiary dcutal dilation. Spleen: Spleen is normal in size and CT density. Pancreas: Pancreas is normal. There is no evidence of pancreatic mass or peripancreatic fluid. Adrenals: Adrenal glands are unremarkable. Kidneys: Kidneys are normal in size. There are no stones or hydronephrosis. Gastrointestinal: Extensive pancolonic diverticulosis without definite evidence of acute diverticulitis. Otherwise unremarkable. Bladder: The bladder is normal. Pelvis:  No suspecious mass. Peritoneum/Mesentery: No fluid collection, ascities, or free air.   Lymph Nodes: No lymphadenopathy. Vasculature: Unremarkable Abdominal Wall: Fat-containing inguinal hernia on the left. Otherwise unremarkable Bony Structures: No acute osseous abnormality     Impression: 1.Extensive pancolonic diverticulosis without definite evidence of acute diverticulitis. 2.Fat-containing left inguinal hernia. Electronically Signed: Ho Paige DO  5/2/2025 10:33 PM EDT  Workstation ID: [id number]    XR Chest 1 View  Result Date: 5/2/2025  XR CHEST 1 VW Date of Exam: 5/2/2025 8:33 PM EDT Indication: patient thinks he aspirated sandwich last night Comparison: None available. Findings: No focal consolidation. No pneumothorax or pleural effusion. Cardiac size is normal. The visualized clavicles appear intact. No displaced rib fractures. The visualized upper abdomen is normal.     Impression: No acute cardiopulmonary disease. Electronically Signed: Antonio Evangelista MD   5/2/2025 8:50 PM EDT  Workstation ID: [id number]        Differential Diagnosis and Discussion:    Flank Pain: Differential diagnosis includes but is not limited to kidney stones, pyelonephritis, musculoskeletal disorders, renal infarction, urinary tract infection, hydronephrosis, radiculopathy, aortic aneurysm, renal cell carcinoma.    PROCEDURES:    Labs were collected in the emergency department and all labs were reviewed and interpreted by me.  CT scan was performed in the emergency department and the CT scan radiology impression was interpreted by me.    No orders to display       Procedures    MDM     Amount and/or Complexity of Data Reviewed  Clinical lab tests: reviewed  Tests in the radiology section of CPT®: reviewed                       Patient Care Considerations:    SEPSIS was considered but is NOT present in the emergency department as SIRS criteria is not present. ANTIBIOTICS: I considered prescribing antibiotics as an outpatient however no bacterial focus of infection was found.      Consultants/Shared Management Plan:    None    Social Determinants of Health:    Patient is independent, reliable, and has access to care.       Disposition and Care Coordination:    Discharged: I considered escalation of care by admitting this patient to the hospital, however CT negative for acute findings.  Patient is not septic or needs IV antibiotics    I have explained the patient´s condition, diagnoses and treatment plan based on the information available to me at this time. I have answered questions and addressed any concerns. The patient has a good  understanding of the patient´s diagnosis, condition, and treatment plan as can be expected at this point. The vital signs have been stable. The patient´s condition is stable and appropriate for discharge from the emergency department.      The patient will pursue further outpatient evaluation with the primary care physician or other designated or consulting physician  as outlined in the discharge instructions. They are agreeable to this plan of care and follow-up instructions have been explained in detail. The patient has received these instructions in written format and has expressed an understanding of the discharge instructions. The patient is aware that any significant change in condition or worsening of symptoms should prompt an immediate return to this or the closest emergency department or call to 911.    Final diagnoses:   Diverticulosis   Left inguinal hernia   Strain of abdominal muscle, initial encounter        ED Disposition       ED Disposition   Discharge    Condition   Stable    Comment   --               This medical record created using voice recognition software.             [place] PA-C  05/02/25 1373

## 2025-05-03 NOTE — DISCHARGE INSTRUCTIONS
Your lab work today does not show any concerning abnormalities.  Your urine was negative for blood or UTI.  Your lung CT scan was negative for kidney stones or diverticulitis.  It does show you have extensive diverticulosis in the left inguinal hernia.  You will need to see your GI doctor for your diverticulosis and I will put in referral for the left inguinal hernia.  Please take Tylenol Motrin as needed for pain and heating pads to the area of pain.

## 2025-08-25 ENCOUNTER — TELEPHONE (OUTPATIENT)
Dept: ORTHOPEDIC SURGERY | Facility: CLINIC | Age: 70
End: 2025-08-25
Payer: MEDICARE

## 2025-08-26 ENCOUNTER — PRIOR AUTHORIZATION (OUTPATIENT)
Dept: ORTHOPEDIC SURGERY | Facility: CLINIC | Age: 70
End: 2025-08-26
Payer: MEDICARE

## (undated) DEVICE — SOL IRR NACL 0.9PCT 3000ML

## (undated) DEVICE — TOWEL,OR,DSP,ST,BLUE,STD,4/PK,20PK/CS: Brand: MEDLINE

## (undated) DEVICE — CYSTO PACK: Brand: MEDLINE INDUSTRIES, INC.

## (undated) DEVICE — GLV SURG SENSICARE SLT PF LF 8 STRL

## (undated) DEVICE — Device

## (undated) DEVICE — GW ZIPWIRE STD/SHFT STR TPR/3CM .038IN 150CM

## (undated) DEVICE — GW PTFE FIX/CORE STFF STR .038 3X150CM

## (undated) DEVICE — SKIN PREP TRAY W/CHG: Brand: MEDLINE INDUSTRIES, INC.

## (undated) DEVICE — NITINOL STONE RETRIEVAL BASKET: Brand: ESCAPE

## (undated) DEVICE — CATH 2L URETRL HC 6F 50CM

## (undated) DEVICE — GW SUREGLIDE FLXTIP ANG/TP .038 3X150CM EA/5

## (undated) DEVICE — URETERAL ACCESS SHEATH SET: Brand: NAVIGATOR HD

## (undated) DEVICE — BASIC SINGLE BASIN-LF: Brand: MEDLINE INDUSTRIES, INC.

## (undated) DEVICE — Y-TYPE TUR/BLADDER IRRIGATION SET, REGULATING CLAMP

## (undated) DEVICE — FIBR LASR HOLMIUM COMPAT 272MH DISP

## (undated) DEVICE — CATH URETH FLEXIMA CONE TP 5F 70CM

## (undated) DEVICE — GW PTFE/COAT STR/TP STFF/BDY .038 150CM STRL EA/5

## (undated) DEVICE — ENDOSCOPIC VALVE WITH ADAPTER.: Brand: SURSEAL® II

## (undated) DEVICE — SYS IRR PUMP SGL ACTN VAC SYR 10CC